# Patient Record
Sex: MALE | Race: WHITE | NOT HISPANIC OR LATINO | Employment: UNEMPLOYED | URBAN - METROPOLITAN AREA
[De-identification: names, ages, dates, MRNs, and addresses within clinical notes are randomized per-mention and may not be internally consistent; named-entity substitution may affect disease eponyms.]

---

## 2024-01-27 ENCOUNTER — HOSPITAL ENCOUNTER (EMERGENCY)
Facility: HOSPITAL | Age: 19
Discharge: HOME | End: 2024-01-27
Attending: EMERGENCY MEDICINE
Payer: COMMERCIAL

## 2024-01-27 VITALS
WEIGHT: 260 LBS | DIASTOLIC BLOOD PRESSURE: 66 MMHG | BODY MASS INDEX: 37.22 KG/M2 | TEMPERATURE: 98.8 F | OXYGEN SATURATION: 100 % | HEART RATE: 86 BPM | HEIGHT: 70 IN | RESPIRATION RATE: 16 BRPM | SYSTOLIC BLOOD PRESSURE: 103 MMHG

## 2024-01-27 DIAGNOSIS — F32.A DEPRESSION, UNSPECIFIED DEPRESSION TYPE: Primary | ICD-10-CM

## 2024-01-27 LAB
AMPHET+METHAMPHET UR QL: NEGATIVE
BARBITURATES UR QL SCN: NEGATIVE
BENZODIAZ UR QL SCN: NEGATIVE
CANNABINOIDS SERPL QL: NEGATIVE
COCAINE UR QL: NEGATIVE
ETHANOL BLD-MCNC: <10 MG/DL (ref 0–10)
ETHANOL UR QL: <0.01 %
FENTANYL UR-MCNC: NEGATIVE NG/ML
HOLD SPECIMEN: NORMAL
HOLD SPECIMEN: NORMAL
METHADONE UR QL SCN: NEGATIVE
OPIATES UR QL: NEGATIVE
OXYCODONE UR QL SCN: NEGATIVE
WHOLE BLOOD HOLD COAG: NORMAL
WHOLE BLOOD HOLD SPECIMEN: NORMAL

## 2024-01-27 PROCEDURE — 80307 DRUG TEST PRSMV CHEM ANLYZR: CPT | Performed by: EMERGENCY MEDICINE

## 2024-01-27 PROCEDURE — 99283 EMERGENCY DEPT VISIT LOW MDM: CPT

## 2024-01-27 PROCEDURE — 36415 COLL VENOUS BLD VENIPUNCTURE: CPT

## 2024-01-27 PROCEDURE — 82077 ASSAY SPEC XCP UR&BREATH IA: CPT | Performed by: EMERGENCY MEDICINE

## 2024-01-27 NOTE — ED PROVIDER NOTES
Time: 2:17 PM EST  Date of encounter:  1/27/2024  Independent Historian/Clinical History and Information was obtained by:   Patient    History is limited by: N/A    Chief Complaint: SI      History of Present Illness:  Patient is a 19 y.o. year old male who presents to the emergency department for evaluation of SI.  Patient reports that this is the 1 year anniversary of his best friend's death and he got very anxious and upset.  States that he also got an argument with his ex-girlfriend today and he was having thoughts of hurting himself.  States he was either an overdose or cut himself.  States that he called the police and they recommended he come here.  The police escorted him here.  He states he is not suicidal anymore and just wants resources.  States he has been admitted in the past in [place].  He just got to [facility] a few months ago.  Does take Latuda.  Denies any alcohol or drug use and states that he is currently 16 days sober.    HPI    Patient Care Team  Primary Care Provider: Provider, No Known    Past Medical History:     Allergies   Allergen Reactions    Hydromorphone Hcl Nausea And Vomiting    Latex Rash    Morphine Nausea And Vomiting     Past Medical History:   Diagnosis Date    ADHD     Anxiety     Bipolar 2 disorder, major depressive episode     MRSA (methicillin resistant staph aureus) culture positive     Right Knee    Oppositional defiant disorder     Separation anxiety disorder      Past Surgical History:   Procedure Laterality Date    INGUINAL HERNIA REPAIR Right     KNEE DEBRIDEMENT Left     WRIST SURGERY Right      History reviewed. No pertinent family history.    Home Medications:  Prior to Admission medications    Not on File        Social History:   Social History     Tobacco Use    Smoking status: Never    Smokeless tobacco: Former     Types: Snuff     Quit date: 2023   Vaping Use    Vaping Use: Former    Substances: Nicotine    Devices: Disposable   Substance Use Topics    Alcohol use:  "Not Currently     Comment: 15 days sob    Drug use: Not Currently     Types: Marijuana         Review of Systems:  Review of Systems   Psychiatric/Behavioral:  Positive for suicidal ideas.         Physical Exam:  /66 (BP Location: Left arm, Patient Position: Sitting)   Pulse 86   Temp 98.8 °F (37.1 °C) (Oral)   Resp 16   Ht 177.8 cm (70\")   Wt 118 kg (260 lb)   SpO2 100%   BMI 37.31 kg/m²     Physical Exam  Vitals and nursing note reviewed.   Constitutional:       Appearance: Normal appearance.   HENT:      Head: Normocephalic and atraumatic.   Eyes:      General: No scleral icterus.  Cardiovascular:      Rate and Rhythm: Normal rate and regular rhythm.      Heart sounds: Normal heart sounds.   Pulmonary:      Effort: Pulmonary effort is normal.      Breath sounds: Normal breath sounds.   Abdominal:      Palpations: Abdomen is soft.      Tenderness: There is no abdominal tenderness.   Musculoskeletal:         General: Normal range of motion.      Cervical back: Normal range of motion.   Skin:     Findings: No rash.   Neurological:      General: No focal deficit present.      Mental Status: He is alert.                  Procedures:  Procedures      Medical Decision Making:      Comorbidities that affect care:    Substance Abuse    External Notes reviewed:    Reviewed urgent care note from 12/9/2020      The following orders were placed and all results were independently analyzed by me:  Orders Placed This Encounter   Procedures    [place] Draw    Ethanol    Urine Drug Screen - Urine, Clean Catch    NPO Diet NPO Type: Strict NPO    Suicide Precautions    Suicide Precautions    Green Top (Gel)    Lavender Top    Gold Top - SST    Light Blue Top       Medications Given in the Emergency Department:  Medications - No data to display     ED Course:    ED Course as of 01/27/24 1630   Sat Jan 27, 2024   1630 Spoke with [employer] from [facility] who stated the patient can be discharged and has outpatient " follow-up on Thursday [MA]      ED Course User Index  [MA] Gregory Echeverria MD       Labs:    Lab Results (last 24 hours)       Procedure Component Value Units Date/Time    Urine Drug Screen - Urine, Clean Catch [[id number]]  (Normal) Collected: 01/27/24 1423    Specimen: Urine, Clean Catch Updated: 01/27/24 1503     Amphet/Methamphet, Screen Negative     Barbiturates Screen, Urine Negative     Benzodiazepine Screen, Urine Negative     Cocaine Screen, Urine Negative     Opiate Screen Negative     THC, Screen, Urine Negative     Methadone Screen, Urine Negative     Oxycodone Screen, Urine Negative     Fentanyl, Urine Negative    Narrative:      Negative Thresholds Per Drugs Screened:    Amphetamines                 500 ng/ml  Barbiturates                 200 ng/ml  Benzodiazepines              100 ng/ml  Cocaine                      300 ng/ml  Methadone                    300 ng/ml  Opiates                      300 ng/ml  Oxycodone                    100 ng/ml  THC                           50 ng/ml  Fentanyl                       5 ng/ml      The Normal Value for all drugs tested is negative. This report includes final unconfirmed screening results to be used for medical treatment purposes only. Unconfirmed results must not be used for non-medical purposes such as employment or legal testing. Clinical consideration should be applied to any drug of abuse test, particularly when unconfirmed results are used.            Ethanol [[id number]] Collected: 01/27/24 1453    Specimen: Blood Updated: 01/27/24 1530     Ethanol <10 mg/dL      Ethanol % <0.010 %     Narrative:      Ethanol (Plasma)  <10 Essentially Negative    Toxic Concentrations           mg/dL    Flushing, slowing of reflexes    Impaired visual activity         Depression of CNS              >100  Possible Coma                  >300                Imaging:    No Radiology Exams Resulted Within Past 24 Hours      Differential Diagnosis and  Discussion:    Psychiatric: Differential diagnosis includes but is not limited to depression, psychosis, bipolar disorder, anxiety, manic episode, schizophrenia, and substance abuse.    All labs were reviewed and interpreted by me.    MDM     Amount and/or Complexity of Data Reviewed  Clinical lab tests: reviewed       Patient is a 19-year-old gentleman who presents with depression as well as some SI.  No current SI for the patient but had some this morning.  He was evaluated by psychiatry who recommended outpatient follow-up.  Will DC and have the patient follow-up as an outpatient.          Patient Care Considerations:          Consultants/Shared Management Plan:    Spoke with [facility] who cleared the patient for outpatient follow up.     Social Determinants of Health:    Patient has presented with family members who are responsible, reliable and will ensure follow up care.      Disposition and Care Coordination:    Discharged: I considered escalation of care by admitting this patient to the hospital, however the patient has improved and is suitable and  stable for discharge.    I have explained the patient´s condition, diagnoses and treatment plan based on the information available to me at this time. I have answered questions and addressed any concerns. The patient has a good  understanding of the patient´s diagnosis, condition, and treatment plan as can be expected at this point. The vital signs have been stable. The patient´s condition is stable and appropriate for discharge from the emergency department.      The patient will pursue further outpatient evaluation with the primary care physician or other designated or consulting physician as outlined in the discharge instructions. They are agreeable to this plan of care and follow-up instructions have been explained in detail. The patient has received these instructions in written format and have expressed an understanding of the discharge instructions. The  patient is aware that any significant change in condition or worsening of symptoms should prompt an immediate return to this or the closest emergency department or call to 911.      Final diagnoses:   Depression, unspecified depression type        ED Disposition       ED Disposition   Discharge    Condition   Stable    Comment   --               This medical record created using voice recognition software.             Gregory Echeverria MD  01/27/24 5716

## 2024-01-27 NOTE — SIGNIFICANT NOTE
01/27/24 1536   Plan   Plan Comments SW contacted [facility] for ER consult per provider request. Consult will be done via zoom.

## 2024-01-27 NOTE — Clinical Note
[facility] EMERGENCY ROOM  [address]  [place] [address]  Phone: [phone]    Arnaldo Rebolledo was seen and treated in our emergency department on 1/27/2024.  He may return to work on 01/29/2024.         Thank you for choosing [facility].    Gregory Echeverria MD

## 2024-05-20 ENCOUNTER — TELEMEDICINE (OUTPATIENT)
Dept: FAMILY MEDICINE CLINIC | Facility: TELEHEALTH | Age: 19
End: 2024-05-20
Payer: COMMERCIAL

## 2024-05-20 VITALS — TEMPERATURE: 97.5 F

## 2024-05-20 DIAGNOSIS — R11.0 NAUSEA: ICD-10-CM

## 2024-05-20 DIAGNOSIS — J22 ACUTE RESPIRATORY INFECTION: Primary | ICD-10-CM

## 2024-05-20 PROCEDURE — 99213 OFFICE O/P EST LOW 20 MIN: CPT | Performed by: NURSE PRACTITIONER

## 2024-05-20 RX ORDER — OXCARBAZEPINE 150 MG/1
TABLET, FILM COATED ORAL
COMMUNITY
Start: 2024-03-21

## 2024-05-20 RX ORDER — BROMPHENIRAMINE MALEATE, PSEUDOEPHEDRINE HYDROCHLORIDE, AND DEXTROMETHORPHAN HYDROBROMIDE 2; 30; 10 MG/5ML; MG/5ML; MG/5ML
10 SYRUP ORAL 4 TIMES DAILY PRN
Qty: 200 ML | Refills: 0 | Status: SHIPPED | OUTPATIENT
Start: 2024-05-20 | End: 2024-05-20 | Stop reason: ALTCHOICE

## 2024-05-20 RX ORDER — ONDANSETRON 8 MG/1
8 TABLET, ORALLY DISINTEGRATING ORAL EVERY 8 HOURS PRN
Qty: 12 TABLET | Refills: 0 | Status: SHIPPED | OUTPATIENT
Start: 2024-05-20

## 2024-05-20 RX ORDER — CHLORCYCLIZINE HYDROCHLORIDE AND PSEUDOEPHEDRINE HYDROCHLORIDE 25; 60 MG/1; MG/1
1 TABLET ORAL 3 TIMES DAILY PRN
Qty: 15 TABLET | Refills: 0 | Status: SHIPPED | OUTPATIENT
Start: 2024-05-20

## 2024-05-20 NOTE — LETTER
May 20, 2024     Patient: Arnaldo Rebolledo   YOB: 2005   Date of Visit: 5/20/2024       To Whom It May Concern:    It is my medical opinion that Arnaldo Rebolledo may return to work on Wednesday 5/22/2024.           Sincerely,    CYNDY Denney    CC:   No Recipients

## 2024-05-20 NOTE — LETTER
May 20, 2024     Patient: Arnaldo Rebolledo   YOB: 2005   Date of Visit: 5/20/2024       To Whom It May Concern:    It is my medical opinion that Arnaldo Rebolledo may return to work tomorrow on 5/21/2024.           Sincerely,    CYNDY Denney    CC:   No Recipients

## 2024-05-20 NOTE — PROGRESS NOTES
Subjective   Chief Complaint   Patient presents with    URI       Arnaldo Rebolledo is a 19 y.o. male.     History of Present Illness  Urgent care tyto.  Patient reports cough, congestion, headache, sore throat, nausea and diarrhea that started last night.  He has had chills and sweating but no measured fever. No known sick contacts.   URI   This is a new problem. The current episode started yesterday. The problem has been unchanged. Associated symptoms include congestion, coughing, diarrhea, headaches, nausea and a sore throat. Pertinent negatives include no abdominal pain, chest pain, dysuria, ear pain, plugged ear sensation, rhinorrhea, sinus pain, vomiting or wheezing. He has tried nothing for the symptoms.        Allergies   Allergen Reactions    Hydromorphone Hcl Nausea And Vomiting    Latex Rash    Morphine Nausea And Vomiting       Past Medical History:   Diagnosis Date    ADHD     Anxiety     Bipolar 2 disorder, major depressive episode     MRSA (methicillin resistant staph aureus) culture positive     Right Knee    Oppositional defiant disorder     Separation anxiety disorder        Past Surgical History:   Procedure Laterality Date    INGUINAL HERNIA REPAIR Right     KNEE DEBRIDEMENT Left     WRIST SURGERY Right        Social History     Socioeconomic History    Marital status: Single   Tobacco Use    Smoking status: Never    Smokeless tobacco: Former     Types: Snuff     Quit date: 2023   Vaping Use    Vaping status: Every Day    Substances: Nicotine    Devices: Disposable   Substance and Sexual Activity    Alcohol use: Not Currently     Comment: 15 days sob    Drug use: Not Currently     Types: Marijuana    Sexual activity: Defer       History reviewed. No pertinent family history.      Current Outpatient Medications:     OXcarbazepine (TRILEPTAL) 150 MG tablet, , Disp: , Rfl:     Chlorcyclizine-Pseudoephed (Stahist AD) 25-60 MG tablet, Take 1 tablet by mouth 3 (Three) Times a Day As Needed (congestion).,  Disp: 15 tablet, Rfl: 0    Lurasidone HCl (LATUDA) 20 MG tablet tablet, , Disp: , Rfl:     ondansetron ODT (ZOFRAN-ODT) 8 MG disintegrating tablet, Place 1 tablet on the tongue Every 8 (Eight) Hours As Needed for Nausea or Vomiting., Disp: 12 tablet, Rfl: 0      Review of Systems   Constitutional:  Positive for chills and diaphoresis.   HENT:  Positive for congestion and sore throat. Negative for ear pain and rhinorrhea.    Respiratory:  Positive for cough. Negative for chest tightness, shortness of breath and wheezing.    Cardiovascular:  Negative for chest pain.   Gastrointestinal:  Positive for diarrhea and nausea. Negative for abdominal pain and vomiting.   Genitourinary:  Negative for dysuria.   Neurological:  Positive for headache.        Vitals:    05/20/24 1321   Temp: 97.5 °F (36.4 °C)       Objective   Physical Exam  Constitutional:       General: He is not in acute distress.     Appearance: Normal appearance. He is not ill-appearing, toxic-appearing or diaphoretic.   HENT:      Head: Normocephalic.      Nose: Nose normal.      Mouth/Throat:      Lips: Pink.      Mouth: Mucous membranes are moist.      Pharynx: Posterior oropharyngeal erythema present.      Tonsils: No tonsillar exudate.   Cardiovascular:      Rate and Rhythm: Regular rhythm.   Pulmonary:      Effort: Pulmonary effort is normal.      Breath sounds: Normal breath sounds.   Neurological:      Mental Status: He is alert and oriented to person, place, and time.          Procedures     Assessment & Plan   Diagnoses and all orders for this visit:    1. Acute respiratory infection (Primary)  -     POC Strep A, PCR (Roche Krystle); Future  -     KRYSTLE FLU + SARS PCR; Future  -     Discontinue: brompheniramine-pseudoephedrine-DM 30-2-10 MG/5ML syrup; Take 10 mL by mouth 4 (Four) Times a Day As Needed for Congestion, Cough or Allergies.  Dispense: 200 mL; Refill: 0  -     Chlorcyclizine-Pseudoephed (Stahist AD) 25-60 MG tablet; Take 1 tablet by mouth 3  (Three) Times a Day As Needed (congestion).  Dispense: 15 tablet; Refill: 0    2. Nausea  -     ondansetron ODT (ZOFRAN-ODT) 8 MG disintegrating tablet; Place 1 tablet on the tongue Every 8 (Eight) Hours As Needed for Nausea or Vomiting.  Dispense: 12 tablet; Refill: 0      Alternate tylenol and motrin for pain and/or fever, stay hydrated and rest.     If symptoms worsen or do not improve follow up with your PCP or visit your nearest Urgent Care Center or ER.      PLAN: Discussed dosing, side effects, recommended other symptomatic care.  Patient should follow up with primary care provider, Urgent Care or ER if symptoms worsen, fail to resolve or other symptoms need attention. Patient/family agree to the above.         CYNDY Denney     The use of a video visit has been reviewed with the patient and verbal informed consent has been obtained. Myself and Arnaldo Rebolledo participated in this visit. The patient is located at [address] [place] [address]. I am located in [place], [place]. Mychart and Zoom were utilized.        This visit was performed via Telehealth.  This patient has been instructed to follow-up with their primary care provider if their symptoms worsen or the treatment provided does not resolve their illness.

## 2024-12-16 DIAGNOSIS — Z31.440 ENCOUNTER OF MALE FOR TESTING FOR GENETIC DISEASE CARRIER STATUS FOR PROCREATIVE MANAGEMENT: Primary | ICD-10-CM

## 2024-12-18 ENCOUNTER — HOSPITAL ENCOUNTER (EMERGENCY)
Facility: HOSPITAL | Age: 19
Discharge: HOME | End: 2024-12-18
Attending: EMERGENCY MEDICINE
Payer: COMMERCIAL

## 2024-12-18 ENCOUNTER — APPOINTMENT (OUTPATIENT)
Dept: CT IMAGING | Facility: HOSPITAL | Age: 19
End: 2024-12-18
Payer: COMMERCIAL

## 2024-12-18 VITALS
DIASTOLIC BLOOD PRESSURE: 83 MMHG | BODY MASS INDEX: 37.04 KG/M2 | HEART RATE: 82 BPM | HEIGHT: 71 IN | SYSTOLIC BLOOD PRESSURE: 118 MMHG | WEIGHT: 264.55 LBS | OXYGEN SATURATION: 97 % | TEMPERATURE: 98.3 F | RESPIRATION RATE: 20 BRPM

## 2024-12-18 DIAGNOSIS — R11.2 NAUSEA AND VOMITING, UNSPECIFIED VOMITING TYPE: Primary | ICD-10-CM

## 2024-12-18 DIAGNOSIS — K76.0 HEPATIC STEATOSIS: ICD-10-CM

## 2024-12-18 LAB
ALBUMIN SERPL-MCNC: 4.5 G/DL (ref 3.5–5.2)
ALBUMIN/GLOB SERPL: 1.5 G/DL
ALP SERPL-CCNC: 76 U/L (ref 39–117)
ALT SERPL W P-5'-P-CCNC: 42 U/L (ref 1–41)
ANION GAP SERPL CALCULATED.3IONS-SCNC: 11.7 MMOL/L (ref 5–15)
AST SERPL-CCNC: 26 U/L (ref 1–40)
BASOPHILS # BLD AUTO: 0.06 10*3/MM3 (ref 0–0.2)
BASOPHILS NFR BLD AUTO: 0.7 % (ref 0–1.5)
BILIRUB SERPL-MCNC: 0.6 MG/DL (ref 0–1.2)
BILIRUB UR QL STRIP: NEGATIVE
BUN SERPL-MCNC: 14 MG/DL (ref 6–20)
BUN/CREAT SERPL: 12.5 (ref 7–25)
CALCIUM SPEC-SCNC: 9.6 MG/DL (ref 8.6–10.5)
CHLORIDE SERPL-SCNC: 101 MMOL/L (ref 98–107)
CLARITY UR: CLEAR
CO2 SERPL-SCNC: 25.3 MMOL/L (ref 22–29)
COLOR UR: YELLOW
CREAT SERPL-MCNC: 1.12 MG/DL (ref 0.76–1.27)
D-LACTATE SERPL-SCNC: 0.9 MMOL/L (ref 0.5–2)
DEPRECATED RDW RBC AUTO: 40.4 FL (ref 37–54)
EGFRCR SERPLBLD CKD-EPI 2021: 95.9 ML/MIN/1.73
EOSINOPHIL # BLD AUTO: 0.29 10*3/MM3 (ref 0–0.4)
EOSINOPHIL NFR BLD AUTO: 3.5 % (ref 0.3–6.2)
ERYTHROCYTE [DISTWIDTH] IN BLOOD BY AUTOMATED COUNT: 13.2 % (ref 12.3–15.4)
GLOBULIN UR ELPH-MCNC: 3 GM/DL
GLUCOSE SERPL-MCNC: 95 MG/DL (ref 65–99)
GLUCOSE UR STRIP-MCNC: NEGATIVE MG/DL
HCT VFR BLD AUTO: 50 % (ref 37.5–51)
HGB BLD-MCNC: 16.2 G/DL (ref 13–17.7)
HGB UR QL STRIP.AUTO: NEGATIVE
HOLD SPECIMEN: NORMAL
HOLD SPECIMEN: NORMAL
IMM GRANULOCYTES # BLD AUTO: 0.03 10*3/MM3 (ref 0–0.05)
IMM GRANULOCYTES NFR BLD AUTO: 0.4 % (ref 0–0.5)
KETONES UR QL STRIP: NEGATIVE
LEUKOCYTE ESTERASE UR QL STRIP.AUTO: NEGATIVE
LIPASE SERPL-CCNC: 22 U/L (ref 13–60)
LYMPHOCYTES # BLD AUTO: 2.62 10*3/MM3 (ref 0.7–3.1)
LYMPHOCYTES NFR BLD AUTO: 32 % (ref 19.6–45.3)
MCH RBC QN AUTO: 27.3 PG (ref 26.6–33)
MCHC RBC AUTO-ENTMCNC: 32.4 G/DL (ref 31.5–35.7)
MCV RBC AUTO: 84.2 FL (ref 79–97)
MONOCYTES # BLD AUTO: 0.56 10*3/MM3 (ref 0.1–0.9)
MONOCYTES NFR BLD AUTO: 6.8 % (ref 5–12)
NEUTROPHILS NFR BLD AUTO: 4.63 10*3/MM3 (ref 1.7–7)
NEUTROPHILS NFR BLD AUTO: 56.6 % (ref 42.7–76)
NITRITE UR QL STRIP: NEGATIVE
NRBC BLD AUTO-RTO: 0 /100 WBC (ref 0–0.2)
PH UR STRIP.AUTO: 6 [PH] (ref 5–8)
PLATELET # BLD AUTO: 238 10*3/MM3 (ref 140–450)
PMV BLD AUTO: 9.6 FL (ref 6–12)
POTASSIUM SERPL-SCNC: 4.1 MMOL/L (ref 3.5–5.2)
PROT SERPL-MCNC: 7.5 G/DL (ref 6–8.5)
PROT UR QL STRIP: NEGATIVE
RBC # BLD AUTO: 5.94 10*6/MM3 (ref 4.14–5.8)
SODIUM SERPL-SCNC: 138 MMOL/L (ref 136–145)
SP GR UR STRIP: 1.03 (ref 1–1.03)
UROBILINOGEN UR QL STRIP: NORMAL
WBC NRBC COR # BLD AUTO: 8.19 10*3/MM3 (ref 3.4–10.8)
WHOLE BLOOD HOLD COAG: NORMAL
WHOLE BLOOD HOLD SPECIMEN: NORMAL

## 2024-12-18 PROCEDURE — 99285 EMERGENCY DEPT VISIT HI MDM: CPT

## 2024-12-18 PROCEDURE — 25510000001 IOPAMIDOL PER 1 ML: Performed by: EMERGENCY MEDICINE

## 2024-12-18 PROCEDURE — 85025 COMPLETE CBC W/AUTO DIFF WBC: CPT

## 2024-12-18 PROCEDURE — 80053 COMPREHEN METABOLIC PANEL: CPT

## 2024-12-18 PROCEDURE — 74177 CT ABD & PELVIS W/CONTRAST: CPT

## 2024-12-18 PROCEDURE — 83690 ASSAY OF LIPASE: CPT

## 2024-12-18 PROCEDURE — 36415 COLL VENOUS BLD VENIPUNCTURE: CPT

## 2024-12-18 PROCEDURE — 96374 THER/PROPH/DIAG INJ IV PUSH: CPT

## 2024-12-18 PROCEDURE — 83605 ASSAY OF LACTIC ACID: CPT

## 2024-12-18 PROCEDURE — 25010000002 ONDANSETRON PER 1 MG

## 2024-12-18 PROCEDURE — 81003 URINALYSIS AUTO W/O SCOPE: CPT | Performed by: EMERGENCY MEDICINE

## 2024-12-18 RX ORDER — IOPAMIDOL 755 MG/ML
100 INJECTION, SOLUTION INTRAVASCULAR
Status: COMPLETED | OUTPATIENT
Start: 2024-12-18 | End: 2024-12-18

## 2024-12-18 RX ORDER — SODIUM CHLORIDE 0.9 % (FLUSH) 0.9 %
10 SYRINGE (ML) INJECTION AS NEEDED
Status: DISCONTINUED | OUTPATIENT
Start: 2024-12-18 | End: 2024-12-18 | Stop reason: HOSPADM

## 2024-12-18 RX ORDER — FAMOTIDINE 20 MG/1
20 TABLET, FILM COATED ORAL 2 TIMES DAILY
Qty: 30 TABLET | Refills: 0 | Status: SHIPPED | OUTPATIENT
Start: 2024-12-18

## 2024-12-18 RX ORDER — ONDANSETRON 4 MG/1
4 TABLET, ORALLY DISINTEGRATING ORAL EVERY 8 HOURS PRN
Qty: 15 TABLET | Refills: 0 | Status: SHIPPED | OUTPATIENT
Start: 2024-12-18

## 2024-12-18 RX ORDER — ONDANSETRON 2 MG/ML
4 INJECTION INTRAMUSCULAR; INTRAVENOUS ONCE
Status: COMPLETED | OUTPATIENT
Start: 2024-12-18 | End: 2024-12-18

## 2024-12-18 RX ADMIN — IOPAMIDOL 100 ML: 755 INJECTION, SOLUTION INTRAVENOUS at 17:17

## 2024-12-18 RX ADMIN — ONDANSETRON 4 MG: 2 INJECTION INTRAMUSCULAR; INTRAVENOUS at 18:17

## 2024-12-18 NOTE — DISCHARGE INSTRUCTIONS
Thank you for allowing us to provide care for you today. Your work up revealed no acute findings on blood or ct imaging. As discussed, please follow up with family medicine referral in the next 7 days for ongoing care along with GI referral as needed for ongoing vomiting refractory to zofran and pepcid care.  Please return the MD in the event that you have a repeat episode of bloody emesis, fever, chest pain, shortness of breath, loss of consciousness.  Thank you

## 2024-12-18 NOTE — ED PROVIDER NOTES
Time: 2:36 PM EST  Date of encounter:  12/18/2024  Independent Historian/Clinical History and Information was obtained by:   Patient and Family    History is limited by: N/A    Chief Complaint   Patient presents with    Vomiting     Throwing up bright red blood started last night with abd pain, - denies ETOH use.          History of Present Illness:  Patient is a 19 y.o. year old male who presents to the emergency department for evaluation of hematemesis.  Patient reports he began vomiting last night and there was bright red blood in the vomit.  Vomited multiple times throughout the night and this morning.  Took 1 dose of ibuprofen yesterday.  Has not had alcohol in 6 months, denies heavy NSAID use.  Having lower abdominal pain with it.  No fever, chills, shortness of breath, history of liver problems, melena, coffee-ground emesis.  Is concerned for cancer.     Patient Care Team  Primary Care Provider: Provider, No Known    Past Medical History:     Allergies   Allergen Reactions    Hydromorphone Hcl Nausea And Vomiting    Latex Rash    Morphine Nausea And Vomiting     Past Medical History:   Diagnosis Date    ADHD     Anxiety     Bipolar 2 disorder, major depressive episode     MRSA (methicillin resistant staph aureus) culture positive     Right Knee    Oppositional defiant disorder     Separation anxiety disorder      Past Surgical History:   Procedure Laterality Date    INGUINAL HERNIA REPAIR Right     KNEE DEBRIDEMENT Left     WRIST SURGERY Right      No family history on file.    Home Medications:  Prior to Admission medications    Medication Sig Start Date End Date Taking? Authorizing Provider   azelastine (ASTELIN) 0.1 % nasal spray 2 sprays into the nostril(s) as directed by provider 2 (Two) Times a Day. Use in each nostril as directed 7/2/24   Jorden Mcclelland PA   brompheniramine-pseudoephedrine-DM 30-2-10 MG/5ML syrup Take 10 mL by mouth 4 (Four) Times a Day As Needed for Congestion, Cough or Allergies.  "7/2/24   Jorden Mcclelland PA        Social History:   Social History     Tobacco Use    Smoking status: Never    Smokeless tobacco: Former     Types: Snuff     Quit date: 2023   Vaping Use    Vaping status: Every Day    Substances: Nicotine    Devices: Disposable   Substance Use Topics    Alcohol use: Not Currently     Comment: 2 1/2 months sober    Drug use: Not Currently     Types: Marijuana         Review of Systems:  Review of Systems   Constitutional:  Negative for fatigue and fever.   HENT:  Negative for congestion and ear pain.    Respiratory:  Negative for cough and shortness of breath.    Gastrointestinal:  Positive for abdominal pain, nausea and vomiting. Negative for abdominal distention and constipation.   Genitourinary:  Negative for difficulty urinating and dysuria.        Physical Exam:  /83 (BP Location: Left arm, Patient Position: Sitting)   Pulse 82   Temp 98.3 °F (36.8 °C) (Oral)   Resp 20   Ht 180.3 cm (71\")   Wt 120 kg (264 lb 8.8 oz)   SpO2 97%   BMI 36.90 kg/m²         Physical Exam  Constitutional:       Appearance: Normal appearance.   HENT:      Head: Normocephalic.   Eyes:      Extraocular Movements: Extraocular movements intact.      Conjunctiva/sclera: Conjunctivae normal.   Pulmonary:      Effort: Pulmonary effort is normal.   Abdominal:      General: There is no distension.      Tenderness: There is abdominal tenderness (Mild left sided abdominal TTP). There is no guarding or rebound.   Skin:     General: Skin is warm.      Coloration: Skin is not cyanotic.   Neurological:      Mental Status: He is alert and oriented to person, place, and time.   Psychiatric:         Attention and Perception: Attention and perception normal.         Mood and Affect: Mood normal.        ***                    Medical Decision Making:      Comorbidities that affect care:    {Comorbidities that affect care:04179}    External Notes reviewed:    {External Note review " (Optional):23572}      The following orders were placed and all results were independently analyzed by me:  No orders of the defined types were placed in this encounter.      Medications Given in the Emergency Department:  Medications - No data to display     ED Course:    The patient was initially evaluated in the triage area where orders were placed. The patient was later dispositioned by Liz Brown PA-C.      The patient was advised to stay for completion of workup which includes but is not limited to communication of labs and radiological results, reassessment and plan. The patient was advised that leaving prior to disposition by a provider could result in critical findings that are not communicated to the patient.     ED Course as of 12/18/24 1800   Wed Dec 18, 2024   1438 PROVIDER IN TRIAGE  Patient was evaluated by me in triage Liz Brown PA-C.  Orders are placed and patient is currently awaiting final results and disposition.  [AS]   1750 CBC reviewed.  No acute findings. [CB]   1750 CMP reviewed.  No acute findings. [CB]   1750 UA reviewed.  No acute findings. [CB]   1751 Lipase reviewed.  No acute findings [CB]   1751 Lactate reviewed.  No acute findings. [CB]   1751 CT abdomen revealed no acute findings today.  Evidence of hepatic steatosis distant with patient's prior medical history of alcohol abuse. [CB]      ED Course User Index  [AS] Liz Brown PA-C  [CB] Bhaskar Tucker PA-C       Labs:    Lab Results (last 24 hours)       ** No results found for the last 24 hours. **             Imaging:    No Radiology Exams Resulted Within Past 24 Hours      Differential Diagnosis and Discussion:      {Differentials:33357}    PROCEDURES:    {Independent Review of (Optional):11305}    No orders to display        Procedures    MDM         {CRITICAL CARE:03305}      {SEPSIS RECOGNITION:15188}    Patient Care Considerations:    {Considerations (Optional):36785}      Consultants/Shared  Management Plan:    {Shared Management Plan (Optional):13310}    Social Determinants of Health:    {Social Determinants of Health (Optional):46693}      Disposition and Care Coordination:    {Admission consideration:75013}    {Discharge (Optional):11290}    Final diagnoses:   None        ED Disposition       None            This medical record created using voice recognition software.           available to me at this time. I have answered questions and addressed any concerns. The patient has a good  understanding of the patient´s diagnosis, condition, and treatment plan as can be expected at this point. The vital signs have been stable. The patient´s condition is stable and appropriate for discharge from the emergency department.      The patient will pursue further outpatient evaluation with the primary care physician or other designated or consulting physician as outlined in the discharge instructions. They are agreeable to this plan of care and follow-up instructions have been explained in detail. The patient has received these instructions in written format and has expressed an understanding of the discharge instructions. The patient is aware that any significant change in condition or worsening of symptoms should prompt an immediate return to this or the closest emergency department or call to 911.  I have explained discharge medications and the need for follow up with the patient/caretakers. This was also printed in the discharge instructions. Patient was discharged with the following medications and follow up:      Medication List        New Prescriptions      famotidine 20 MG tablet  Commonly known as: PEPCID  Take 1 tablet by mouth 2 (Two) Times a Day.     ondansetron ODT 4 MG disintegrating tablet  Commonly known as: ZOFRAN-ODT  Place 1 tablet on the tongue Every 8 (Eight) Hours As Needed for Nausea or Vomiting.               Where to Get Your Medications        These medications were sent to [facility] [address] [place], [place] [address] Critical access hospital [phone] [facility] [phone] [address], [place] [address]      Phone: [phone]   famotidine 20 MG tablet  ondansetron ODT 4 MG disintegrating tablet      Susana Walsh APRN  [address] [id number]  [phone]          Tiburcio Carbone MD  [address] [address]  [phone]              Final diagnoses:   Nausea and vomiting, unspecified vomiting type   Hepatic steatosis        ED Disposition       ED Disposition   Discharge    Condition   Stable    Comment   --               This medical record created using voice recognition software.             Bhaskar Tucker, PA-C  12/24/24 1600

## 2024-12-18 NOTE — Clinical Note
[facility] EMERGENCY ROOM  [address] [address]  Phone: [phone]  Fax: [phone]    Arnadlo Rebolledo was seen and treated in our emergency department on 12/18/2024.  He may return to work on 12/19/2024.         Thank you for choosing [facility].    Bhaskar Tucker, PRASANNA

## 2024-12-19 ENCOUNTER — LAB (OUTPATIENT)
Dept: OBSTETRICS AND GYNECOLOGY | Facility: CLINIC | Age: 19
End: 2024-12-19
Payer: COMMERCIAL

## 2024-12-19 DIAGNOSIS — Z31.440 ENCOUNTER OF MALE FOR TESTING FOR GENETIC DISEASE CARRIER STATUS FOR PROCREATIVE MANAGEMENT: ICD-10-CM

## 2024-12-20 ENCOUNTER — OFFICE VISIT (OUTPATIENT)
Dept: FAMILY MEDICINE CLINIC | Facility: CLINIC | Age: 19
End: 2024-12-20
Payer: COMMERCIAL

## 2024-12-20 VITALS
OXYGEN SATURATION: 94 % | SYSTOLIC BLOOD PRESSURE: 111 MMHG | BODY MASS INDEX: 37.52 KG/M2 | HEART RATE: 85 BPM | HEIGHT: 71 IN | WEIGHT: 268 LBS | DIASTOLIC BLOOD PRESSURE: 75 MMHG

## 2024-12-20 DIAGNOSIS — F91.3 OPPOSITIONAL DEFIANT DISORDER: ICD-10-CM

## 2024-12-20 DIAGNOSIS — Z80.6 FAMILY HISTORY OF LEUKEMIA: ICD-10-CM

## 2024-12-20 DIAGNOSIS — F10.21 HISTORY OF ALCOHOLISM: ICD-10-CM

## 2024-12-20 DIAGNOSIS — Z80.9 FAMILY HISTORY OF CANCER: ICD-10-CM

## 2024-12-20 DIAGNOSIS — Z80.8 FAMILY HISTORY OF THYROID CANCER: ICD-10-CM

## 2024-12-20 DIAGNOSIS — Z11.59 NEED FOR HEPATITIS C SCREENING TEST: ICD-10-CM

## 2024-12-20 DIAGNOSIS — Z80.8 FAMILY HISTORY OF BRAIN CANCER: ICD-10-CM

## 2024-12-20 DIAGNOSIS — Z83.3 FAMILY HISTORY OF DIABETES MELLITUS: ICD-10-CM

## 2024-12-20 DIAGNOSIS — K76.0 HEPATIC STEATOSIS: ICD-10-CM

## 2024-12-20 DIAGNOSIS — Z80.0 FAMILY HISTORY OF COLON CANCER: ICD-10-CM

## 2024-12-20 DIAGNOSIS — F90.2 ATTENTION DEFICIT HYPERACTIVITY DISORDER (ADHD), COMBINED TYPE: ICD-10-CM

## 2024-12-20 DIAGNOSIS — Z72.0 TOBACCO USE: Primary | ICD-10-CM

## 2024-12-20 DIAGNOSIS — F93.0 SEPARATION ANXIETY DISORDER: ICD-10-CM

## 2024-12-20 DIAGNOSIS — R29.898 FINDING OF FLOATING RIB: ICD-10-CM

## 2024-12-20 DIAGNOSIS — Z13.29 SCREENING FOR THYROID DISORDER: ICD-10-CM

## 2024-12-20 DIAGNOSIS — F31.9 BIPOLAR DISORDER WITH DEPRESSION: ICD-10-CM

## 2024-12-20 DIAGNOSIS — Z80.1 FAMILY HISTORY OF LUNG CANCER: ICD-10-CM

## 2024-12-20 PROCEDURE — 99395 PREV VISIT EST AGE 18-39: CPT

## 2024-12-20 PROCEDURE — 1160F RVW MEDS BY RX/DR IN RCRD: CPT

## 2024-12-20 PROCEDURE — 1159F MED LIST DOCD IN RCRD: CPT

## 2024-12-20 PROCEDURE — 2014F MENTAL STATUS ASSESS: CPT

## 2024-12-20 RX ORDER — DISULFIRAM 250 MG/1
250 TABLET ORAL DAILY
Qty: 30 TABLET | Refills: 2 | Status: SHIPPED | OUTPATIENT
Start: 2024-12-20

## 2024-12-20 NOTE — PROGRESS NOTES
Chief Complaint    Annual Exam  Establish Care    Subjective          Arnaldo Rebolledo is a 19 y.o. male who presents to [facility]    Annual Exam    History of Present Illness  The patient presents for evaluation of hepatic steatosis, floating rib pain, psychiatric issues, and alcohol dependence.    He has a medical history of ADHD, alcoholism, anxiety, bipolar 2 disorder, hepatic steatosis, MRSA of his right knee, oppositional defiant disorder, and separation anxiety disorder. He was previously on Latuda. His mother has a history of diabetes, and his father has a history of heart disease. He is a current everyday vapor user. He reports experiencing left lower quadrant abdominal pain and nausea, which he attributes to food poisoning. He was diagnosed with food poisoning and severe liver damage during an emergency room visit 2 days ago. He is seeking to monitor his liver condition to prevent further deterioration. He is unable to provide a blood sample today due to recent food intake but is willing to return tomorrow or Monday for the procedure. He is currently taking Pepcid and Zofran.    He has a history of alcohol consumption from ages 11 to 19 and has been sober for 6 months. He has a history of marijuana use and continues to vape daily. He occasionally uses smokeless tobacco. He expresses interest in a medication that induces vomiting when alcohol is consumed, as he struggles with daily cravings. He has not attended Alcoholics Anonymous or rehabilitation and prefers not to do so. He works night shifts and often experiences cravings at home and work. He has a history of alcohol use disorder and has been sober for 6 months. He has a family history of alcoholism.    He has a history of ADHD, anxiety, bipolar 2 disorder, oppositional defiant disorder, and separation anxiety disorder. He was previously under psychiatric care but discontinued due to insurance issues. He reports that  psychiatric medications suppress his appetite and thirst. He has both hyperactivity and inattention associated with his ADHD. He has undergone GeneSight testing and is not interested in psychiatric medications. He has tried various ADHD medications, with Ritalin being the only effective one, although it also suppressed his appetite. He has previously engaged in therapy but struggles with communication. He reports occasional chest pain and shortness of breath, particularly during physical exertion. He also experiences dizziness and lightheadedness while lying in bed, but these symptoms are infrequent.     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 He was informed that this condition is a birth defect and can only be managed with pain medication. He is seeking a topical cream for pain relief due to his recent diagnosis of fatty liver disease. He has been managing the pain with 2000 mg of ibuprofen daily, but this regimen has become ineffective. He has tried Biofreeze, which provides temporary relief, but the pain returns after 30 minutes. He has also tried cyclobenzaprine, which was ineffective and caused fatigue.    He expresses concern about potential cancer risk due to a strong family history of various cancers, including thyroid, lung, brain, colon, and blood cancers. He has not undergone genetic testing for cancer markers but has completed GeneSight testing. He is expecting a child and wants to ensure his health. He underwent gene sequencing testing for cystic fibrosis yesterday, as his partner is a carrier.    SOCIAL HISTORY  The patient is a recovering alcoholic, sober for 6 months, and has a history of drinking beer and liquor. He admits to using marijuana. He is a current everyday vapor user and occasionally uses smokeless tobacco.    FAMILY HISTORY  The patient's mother has a history of borderline diabetic. The patient's father has a history of heart disease. The patient has a  family history of various cancers, including thyroid, lung, brain, colon, and blood cancer. There is also a family history of Parkinson's disease. The patient mentions that alcoholism runs in the family.    MEDICATIONS  Current: Pepcid, Zofran  Past: Latuda, ibuprofen, cyclobenzaprine, Ritalin        PHQ-2 Total Score: 18   PHQ-9 Total Score: 18        Review of Systems   HENT: Negative.     Respiratory: Negative.     Cardiovascular: Negative.    Gastrointestinal:  Positive for abdominal pain and nausea.   Genitourinary: Negative.    Musculoskeletal:  Positive for arthralgias.   Skin: Negative.    Neurological: Negative.    Psychiatric/Behavioral:  Positive for decreased concentration and dysphoric mood. The patient is nervous/anxious and is hyperactive.           Medical History: has a past medical history of ADHD, Alcoholism, Anxiety, Bipolar 2 disorder, major depressive episode, Bipolar depression, Hepatic steatosis, MRSA (methicillin resistant staph aureus) culture positive, Oppositional defiant disorder, and Separation anxiety disorder.     Surgical History: has a past surgical history that includes Wrist surgery (Right); Knee debridement (Left); and Inguinal hernia repair (Right).     Family History: family history includes Diabetes in his mother; Heart disease in his father; Stroke in his paternal grandfather.     Social History: reports that he has never smoked. He quit smokeless tobacco use about 1 years ago.  His smokeless tobacco use included snuff. He reports that he does not currently use alcohol. He reports that he does not currently use drugs after having used the following drugs: Marijuana.    Allergies: Red dye, Hydromorphone hcl, Latex, and Morphine      Health Maintenance Due   Topic Date Due    HEPATITIS C SCREENING  Never done    ANNUAL PHYSICAL  Never done            Current Outpatient Medications:     famotidine (PEPCID) 20 MG tablet, Take 1 tablet by mouth 2 (Two) Times a Day., Disp: 30  "tablet, Rfl: 0    ondansetron ODT (ZOFRAN-ODT) 4 MG disintegrating tablet, Place 1 tablet on the tongue Every 8 (Eight) Hours As Needed for Nausea or Vomiting., Disp: 15 tablet, Rfl: 0    Diclofenac Sodium (VOLTAREN) 1 % gel gel, Apply 4 g topically to the appropriate area as directed 4 (Four) Times a Day As Needed (pain)., Disp: 100 g, Rfl: 0    disulfiram (ANTABUSE) 250 MG tablet, Take 1 tablet by mouth Daily., Disp: 30 tablet, Rfl: 2        There is no immunization history on file for this patient.      Objective       Vitals:    12/20/24 1006   BP: 111/75   Pulse: 85   SpO2: 94%   Weight: 122 kg (268 lb)   Height: 180.3 cm (71\")      Body mass index is 37.38 kg/m².   Wt Readings from Last 3 Encounters:   12/20/24 122 kg (268 lb)   12/18/24 120 kg (264 lb 8.8 oz)   07/05/24 115 kg (253 lb 4.8 oz)      BP Readings from Last 3 Encounters:   12/20/24 111/75   12/18/24 118/83   07/05/24 105/76        Class 2 Severe Obesity (BMI >=35 and <=39.9). Obesity-related health conditions include the following: dyslipidemias. Obesity is newly identified. BMI is is above average; BMI management plan is completed. We discussed portion control and increasing exercise.        Physical Exam  Neck:      Thyroid: No thyromegaly.      Vascular: No carotid bruit.   Cardiovascular:      Rate and Rhythm: Normal rate and regular rhythm.      Pulses: Normal pulses.      Heart sounds: Normal heart sounds.   Pulmonary:      Effort: Pulmonary effort is normal.      Breath sounds: Normal breath sounds.   Abdominal:      General: Abdomen is flat. Bowel sounds are normal.      Palpations: Abdomen is soft.   Lymphadenopathy:      Cervical: No cervical adenopathy.   Neurological:      General: No focal deficit present.      Mental Status: He is alert and oriented to person, place, and time.   Psychiatric:         Mood and Affect: Mood normal.         Behavior: Behavior normal.         Physical Exam  Lungs were auscultated.      Result Review : "       Common labs          12/18/2024    14:40   Common Labs   Glucose 95    BUN 14    Creatinine 1.12    Sodium 138    Potassium 4.1    Chloride 101    Calcium 9.6    Albumin 4.5    Total Bilirubin 0.6    Alkaline Phosphatase 76    AST (SGOT) 26    ALT (SGPT) 42    WBC 8.19    Hemoglobin 16.2    Hematocrit 50.0    Platelets 238        Results  Imaging  CT scan shows fatty liver.                 Assessment and Plan      Assessment & Plan  1. Hepatic steatosis.  His liver enzymes are predominantly within normal limits, with only one parameter slightly elevated. A CT scan confirms the presence of fatty liver. He is advised to abstain from alcohol, maintain adequate hydration, and avoid excessive use of Tylenol. A lipid panel will be ordered to assess his cholesterol levels, given his diagnosis of hepatic steatosis. A repeat hepatic function panel will be scheduled in 3 months to monitor stability. He is also advised to undergo screening for diabetes due to a family history of the condition. If the hepatic function panel indicates increasing liver enzymes, a referral to gastroenterology will be considered.    2. Floating rib pain.  His pain management is challenging due to the need to avoid systemic medications that could impact his liver. A prescription for Voltaren gel will be provided to manage his pain topically.    3. Psychiatric issues.  He has a history of ADHD, anxiety, bipolar 2 disorder, oppositional defiant disorder, and separation anxiety disorder. He is not interested in any psychiatric medications at this time.    4. Alcohol dependence.  He has been sober for 6 months. A prescription for disulfiram will be provided, with instructions to take it consistently at the same time each day.    5. Cancer risk.  Given his family history of various cancers, there is a concern for potential genetic predisposition. A referral to genetics will be made for a comprehensive genetic panel.    Follow-up  The patient will  follow up in 3 months.    Diagnoses and all orders for this visit:    1. Tobacco use (Primary)    2. Hepatic steatosis  -     Hepatic Function Panel; Future  -     Lipid panel; Future    3. Finding of floating rib  -     Diclofenac Sodium (VOLTAREN) 1 % gel gel; Apply 4 g topically to the appropriate area as directed 4 (Four) Times a Day As Needed (pain).  Dispense: 100 g; Refill: 0    4. Attention deficit hyperactivity disorder (ADHD), combined type    5. History of alcoholism  -     disulfiram (ANTABUSE) 250 MG tablet; Take 1 tablet by mouth Daily.  Dispense: 30 tablet; Refill: 2    6. Bipolar disorder with depression    7. Oppositional defiant disorder    8. Separation anxiety disorder    9. Need for hepatitis C screening test  -     Hepatitis C Antibody; Future    10. Screening for thyroid disorder  -     TSH Rfx On Abnormal To Free T4    11. Family history of diabetes mellitus  -     Hemoglobin A1c    12. Family history of cancer  -     Ambulatory Referral to Genetic Counseling/Testing    13. Family history of thyroid cancer  -     Ambulatory Referral to Genetic Counseling/Testing    14. Family history of lung cancer  -     Ambulatory Referral to Genetic Counseling/Testing    15. Family history of brain cancer  -     Ambulatory Referral to Genetic Counseling/Testing    16. Family history of colon cancer  -     Ambulatory Referral to Genetic Counseling/Testing    17. Family history of leukemia  -     Ambulatory Referral to Genetic Counseling/Testing          Follow Up     Return in about 3 months (around 3/20/2025) for liver follow up and labs .    Updated annual wellness visit checklist.  Immunizations discussed.  Screening discussed and/or ordered.  Recommend yearly dental and eye exams. Also discussed monitoring of blood pressure and lipids.      Patient was given instructions and counseling regarding his condition or for health maintenance advice. Please see specific information pulled into the AVS if  appropriate.     Patient or patient representative verbalized consent for the use of Ambient Listening during the visit with  CYNDY Rodrigez for chart documentation. 12/20/2024  10:22 EST    CYNDY Rodrigez

## 2025-01-03 LAB — GENETIC SCN SPEC: NORMAL

## 2025-01-30 ENCOUNTER — TELEPHONE (OUTPATIENT)
Dept: FAMILY MEDICINE CLINIC | Facility: CLINIC | Age: 20
End: 2025-01-30

## 2025-01-30 ENCOUNTER — OFFICE VISIT (OUTPATIENT)
Dept: FAMILY MEDICINE CLINIC | Facility: CLINIC | Age: 20
End: 2025-01-30
Payer: COMMERCIAL

## 2025-01-30 VITALS
WEIGHT: 269.2 LBS | BODY MASS INDEX: 37.69 KG/M2 | HEART RATE: 101 BPM | DIASTOLIC BLOOD PRESSURE: 55 MMHG | SYSTOLIC BLOOD PRESSURE: 91 MMHG | HEIGHT: 71 IN | OXYGEN SATURATION: 100 %

## 2025-01-30 DIAGNOSIS — L03.012 CELLULITIS OF FINGER OF LEFT HAND: ICD-10-CM

## 2025-01-30 DIAGNOSIS — R05.1 ACUTE COUGH: ICD-10-CM

## 2025-01-30 DIAGNOSIS — U07.1 COVID-19: ICD-10-CM

## 2025-01-30 DIAGNOSIS — J02.9 SORE THROAT: Primary | ICD-10-CM

## 2025-01-30 DIAGNOSIS — Z77.22 SECOND HAND SMOKE EXPOSURE: ICD-10-CM

## 2025-01-30 DIAGNOSIS — G43.709 CHRONIC MIGRAINE WITHOUT AURA WITHOUT STATUS MIGRAINOSUS, NOT INTRACTABLE: ICD-10-CM

## 2025-01-30 DIAGNOSIS — J98.01 BRONCHOSPASM: ICD-10-CM

## 2025-01-30 DIAGNOSIS — Z72.89 CURRENT EVERY DAY VAPING: ICD-10-CM
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
## 2025-01-30 LAB
EXPIRATION DATE: ABNORMAL
EXPIRATION DATE: NORMAL
FLUAV AG UPPER RESP QL IA.RAPID: NOT DETECTED
FLUBV AG UPPER RESP QL IA.RAPID: NOT DETECTED
INTERNAL CONTROL: ABNORMAL
INTERNAL CONTROL: NORMAL
Lab: ABNORMAL
Lab: NORMAL
S PYO AG THROAT QL: NEGATIVE
SARS-COV-2 AG UPPER RESP QL IA.RAPID: DETECTED

## 2025-01-30 PROCEDURE — 87880 STREP A ASSAY W/OPTIC: CPT

## 2025-01-30 PROCEDURE — 87428 SARSCOV & INF VIR A&B AG IA: CPT

## 2025-01-30 PROCEDURE — 99213 OFFICE O/P EST LOW 20 MIN: CPT

## 2025-01-30 RX ORDER — SULFAMETHOXAZOLE AND TRIMETHOPRIM 800; 160 MG/1; MG/1
1 TABLET ORAL 2 TIMES DAILY
Qty: 14 TABLET | Refills: 0 | Status: SHIPPED | OUTPATIENT
Start: 2025-01-30 | End: 2025-02-06

## 2025-01-30 RX ORDER — ATOGEPANT 60 MG/1
60 TABLET ORAL DAILY
Qty: 30 TABLET | Refills: 6 | Status: SHIPPED | OUTPATIENT
Start: 2025-01-30

## 2025-01-30 NOTE — PROGRESS NOTES
Chief Complaint   Patient presents with    Migraine    Cough    Sore Throat    Nasal Congestion        Subjective     Arnaldo Rebolledo  has a past medical history of ADHD, Alcoholism, Anxiety, Bipolar 2 disorder, major depressive episode, Bipolar depression, Fatty liver, Hepatic steatosis, MRSA (methicillin resistant staph aureus) culture positive, Oppositional defiant disorder, and Separation anxiety disorder.    HPI    History of Present Illness  The patient is a 20-year-old male who presents today with cough, migraine, and sore throat. He tested positive for COVID-19 today in the office.    He has been experiencing symptoms of COVID-19, including a stuffy nose, for the past 2 days. He reports no fever. He has been experiencing severe coughing spells, which have led to vomiting, starting approximately 1.5 to 2 weeks ago. He has a history of vaping and was previously advised to quit smoking due to his exposure to secondhand smoke from family members. He is considering resuming dipping as an alternative to vaping.    He has been experiencing migraines daily for over a month, a condition he also suffered from in his youth, which he attributes to caffeine consumption. He now consumes caffeine daily and maintains adequate hydration. He reports no aura preceding his migraines but does experience light sensitivity and occasional nausea. He has not been on any specific medication for migraines but has found minimal relief with ibuprofen and Tylenol in the past. He has also found diclofenac effective, which he takes once daily, occasionally twice if needed. He received a Toradol injection at urgent care earlier this month, which provided significant relief. He is currently asymptomatic but notes that his headaches tend to worsen as the day progresses. He recalls an incident where he had to call EMS due to a severe headache that lasted for 8 hours, which was attributed to a side effect of his alcohol medication.     He has a  history of cat bites and scratches, with one bite causing significant pain and swelling in his thumb.    SOCIAL HISTORY  The patient has been sober for 7 months. He vapes daily.        Allergies   Allergen Reactions    Red Dye Other (See Comments)     ,    Hydromorphone Hcl Nausea And Vomiting    Latex Rash    Morphine Nausea And Vomiting         Current Outpatient Medications:     diclofenac (VOLTAREN) 50 MG EC tablet, Take 1 tablet by mouth 2 (Two) Times a Day., Disp: 60 tablet, Rfl: 2    disulfiram (ANTABUSE) 250 MG tablet, Take 1 tablet by mouth Daily., Disp: 30 tablet, Rfl: 2    famotidine (PEPCID) 20 MG tablet, Take 1 tablet by mouth 2 (Two) Times a Day., Disp: 30 tablet, Rfl: 0    ondansetron ODT (ZOFRAN-ODT) 4 MG disintegrating tablet, Place 1 tablet on the tongue Every 8 (Eight) Hours As Needed for Nausea or Vomiting., Disp: 15 tablet, Rfl: 0    There is no problem list on file for this patient.       Past Surgical History:   Procedure Laterality Date    INGUINAL HERNIA REPAIR Right     KNEE DEBRIDEMENT Left     WRIST SURGERY Right        Social History     Socioeconomic History    Marital status: Single   Tobacco Use    Smoking status: Never    Smokeless tobacco: Former     Types: Snuff     Quit date: 2023   Vaping Use    Vaping status: Every Day    Substances: Nicotine, Flavoring    Devices: Disposable   Substance and Sexual Activity    Alcohol use: Not Currently     Comment: 2 1/2 months sober    Drug use: Not Currently     Types: Marijuana    Sexual activity: Defer       Family History   Problem Relation Age of Onset    Diabetes Mother     Heart disease Father     Stroke Paternal Grandfather        Family history, surgical history, past medical history, Allergies and med's reviewed with patient today and updated in [email or web address] EMR.     ROS:  Review of Systems   HENT:  Positive for congestion and sore throat.    Respiratory:  Positive for cough.    Cardiovascular: Negative.    Gastrointestinal: Negative.   "  Genitourinary: Negative.    Skin:  Positive for wound.       OBJECTIVE:  Vitals:    01/30/25 0936   Weight: 122 kg (269 lb 3.2 oz)   Height: 180.3 cm (71\")     Body mass index is 37.55 kg/m².  No LMP for male patient.    Physical Exam  Cardiovascular:      Rate and Rhythm: Normal rate and regular rhythm.      Pulses: Normal pulses.      Heart sounds: Normal heart sounds.   Pulmonary:      Effort: Pulmonary effort is normal.      Breath sounds: Normal breath sounds.   Neurological:      General: No focal deficit present.      Mental Status: He is oriented to person, place, and time. Mental status is at baseline.         Physical Exam      Procedures            Health Maintenance Due   Topic Date Due    TDAP/TD VACCINES (1 - Tdap) Never done    HEPATITIS C SCREENING  Never done        No visits with results within 30 Day(s) from this visit.   Latest known visit with results is:   Lab on 12/19/2024   Component Date Value Ref Range Status    Result 12/19/2024 Comment   Final    Negative  Complete PDF report to be sent via [facility] [facility] or normal method of  delivery       Results  Laboratory Studies  Tested positive for Covid today in office.      ASSESSMENT/ PLAN:    There are no diagnoses linked to this encounter.    Assessment & Plan  1. COVID-19.  He tested positive for COVID-19 in the office today. Symptoms include cough, sore throat, and stuffy nose, which started 2 days ago. He is advised to abstain from work until his symptoms have shown improvement for a duration of 24 hours and he has been fever-free for the same period. A prescription for Paxlovid has been issued.    2. Migraine.  He experiences daily migraines, which have been persistent for the past month. He reports light sensitivity and occasional nausea. He is advised to continue taking CoQ10. A prescription for Qulipta, to be taken daily, has been issued for migraine prevention. Additionally, Ubrelvy has been prescribed for use at the onset of a " migraine. Prior authorization through insurance is required for these medications. He is also advised to avoid red dye, which triggers his headaches. If the insurance does not approve the new medications, older treatments will be considered.    3. Cat bite.  He has a swollen and warm thumb due to a cat bite, indicating a skin infection. A prescription for Bactrim has been issued.    4. Nicotine dependence.  He vapes daily and has a history of exposure to secondhand smoke. He is advised to quit vaping and avoid dipping due to the associated health risks. Nicotine gum has been recommended as a cessation aid. If nicotine gum is not sufficient, he can use nicotine patches in conjunction with the gum.    Orders Placed Today:     No orders of the defined types were placed in this encounter.     Your COVID-19 test result is positive.  You need to quarantine yourself until symptoms improving for 24 hours AND no fever with antipyretics for 24 hours.    If you develop emergency warning signs for COVID-19, get attention immediately.  Emergency warning signs include:  Severe trouble breathing  Persistent pain or pressure in the chest  If confusion or inability to wake  Bluish lips or face  *This list is not all inclusive    The CDC has guidance on COVID and other details.      Management Plan:     An After Visit Summary was printed and given to the patient at discharge.    Follow-up: No follow-ups on file.    Patient or patient representative verbalized consent for the use of Ambient Listening during the visit with  CYNDY Rodrigez for chart documentation. 1/30/2025  10:16 EST    CYNDY Rodrigez 1/30/2025 09:53 EST  This note was electronically signed.

## 2025-01-31 ENCOUNTER — DOCUMENTATION (OUTPATIENT)
Dept: FAMILY MEDICINE CLINIC | Facility: CLINIC | Age: 20
End: 2025-01-31
Payer: COMMERCIAL

## 2025-02-03 ENCOUNTER — TELEPHONE (OUTPATIENT)
Dept: FAMILY MEDICINE CLINIC | Facility: CLINIC | Age: 20
End: 2025-02-03
Payer: COMMERCIAL

## 2025-02-03 NOTE — TELEPHONE ENCOUNTER
Patient called office requesting an extension in work note due to still being sick 01/30/2025 - 02/02/2025. Patient was seen on 01/30/2025. Informed patient PCP MURRAY is out today 02/03/2025 and will give them a call back after speaking with PCP regarding work note.

## 2025-02-06 ENCOUNTER — CLINICAL SUPPORT (OUTPATIENT)
Dept: FAMILY MEDICINE CLINIC | Facility: CLINIC | Age: 20
End: 2025-02-06
Payer: COMMERCIAL

## 2025-02-06 DIAGNOSIS — G44.021 INTRACTABLE CHRONIC CLUSTER HEADACHE: Primary | ICD-10-CM

## 2025-02-06 PROCEDURE — 96372 THER/PROPH/DIAG INJ SC/IM: CPT

## 2025-02-06 RX ORDER — KETOROLAC TROMETHAMINE 30 MG/ML
30 INJECTION, SOLUTION INTRAMUSCULAR; INTRAVENOUS ONCE
Status: COMPLETED | OUTPATIENT
Start: 2025-02-06 | End: 2025-02-06

## 2025-02-06 RX ADMIN — KETOROLAC TROMETHAMINE 30 MG: 30 INJECTION, SOLUTION INTRAMUSCULAR; INTRAVENOUS at 11:31

## 2025-02-06 RX ADMIN — KETOROLAC TROMETHAMINE 30 MG: 30 INJECTION, SOLUTION INTRAMUSCULAR; INTRAVENOUS at 11:30

## 2025-02-10 ENCOUNTER — TELEPHONE (OUTPATIENT)
Dept: FAMILY MEDICINE CLINIC | Facility: CLINIC | Age: 20
End: 2025-02-10

## 2025-02-10 NOTE — TELEPHONE ENCOUNTER
Caller: Arnaldo Rebolledo    Relationship: Self    Best call back number: [phone]     What medication are you requesting: TORADOL     What are your current symptoms: HEADACHE    How long have you been experiencing symptoms: GET THEM EVERYDAY     Have you had these symptoms before:    [x] Yes  [] No    Have you been treated for these symptoms before:   [x] Yes  [] No    If a prescription is needed, what is your preferred pharmacy and phone number: [address] [phone] [facility] [phone] [facility]     Additional notes: PLEASE CALL AND ADVISE.

## 2025-02-11 NOTE — TELEPHONE ENCOUNTER
PATIENT CALLED STATING HE WOKE UP THIS MORNING WITH A HEADACHE AND WOULD LIKE TO KNOW IF THIS WILL BE FILLED.     PATIENT STATES HIS UBRELVY IS GONE.

## 2025-02-12 DIAGNOSIS — G43.709 CHRONIC MIGRAINE WITHOUT AURA WITHOUT STATUS MIGRAINOSUS, NOT INTRACTABLE: ICD-10-CM

## 2025-02-12 NOTE — TELEPHONE ENCOUNTER
Pt explained that he was taking the Ubrelvy at the onset of HA and a few hours later. Pt states that the Ubrelvy is not helping him. Pt states that he has gone back to taking tylenol when he knows he's not supposed to just to numb his headache so he can work. Pt has appt with you tomorrow and plans on talking to you about this.

## 2025-02-13 ENCOUNTER — OFFICE VISIT (OUTPATIENT)
Dept: FAMILY MEDICINE CLINIC | Facility: CLINIC | Age: 20
End: 2025-02-13
Payer: COMMERCIAL

## 2025-02-13 VITALS
WEIGHT: 272.8 LBS | DIASTOLIC BLOOD PRESSURE: 87 MMHG | SYSTOLIC BLOOD PRESSURE: 116 MMHG | OXYGEN SATURATION: 95 % | HEIGHT: 71 IN | BODY MASS INDEX: 38.19 KG/M2 | HEART RATE: 91 BPM

## 2025-02-13 DIAGNOSIS — G43.701 CHRONIC MIGRAINE WITHOUT AURA WITH STATUS MIGRAINOSUS, NOT INTRACTABLE: Primary | ICD-10-CM

## 2025-02-13 PROBLEM — K21.9 GASTROESOPHAGEAL REFLUX DISEASE WITHOUT ESOPHAGITIS: Status: ACTIVE | Noted: 2024-12-30

## 2025-02-13 PROBLEM — F31.9 BIPOLAR DEPRESSION: Chronic | Status: ACTIVE | Noted: 2024-12-30

## 2025-02-13 PROBLEM — F91.3 OPPOSITIONAL DEFIANT DISORDER: Status: ACTIVE | Noted: 2024-12-30

## 2025-02-13 PROBLEM — R29.898 FINDING OF FLOATING RIB: Status: ACTIVE | Noted: 2024-12-30

## 2025-02-13 PROBLEM — F90.8 OTHER SPECIFIED ATTENTION DEFICIT HYPERACTIVITY DISORDER (ADHD): Status: ACTIVE | Noted: 2024-12-30

## 2025-02-13 PROBLEM — F10.20 ALCOHOLISM: Chronic | Status: ACTIVE | Noted: 2024-12-30

## 2025-02-13 PROBLEM — E66.9 OBESITY (BMI 35.0-39.9 WITHOUT COMORBIDITY): Chronic | Status: ACTIVE | Noted: 2024-12-30

## 2025-02-13 PROBLEM — Z59.48 LACK OF ACCESS TO ADEQUATE FOOD: Status: ACTIVE | Noted: 2024-12-30

## 2025-02-13 PROCEDURE — 1160F RVW MEDS BY RX/DR IN RCRD: CPT

## 2025-02-13 PROCEDURE — 1159F MED LIST DOCD IN RCRD: CPT

## 2025-02-13 PROCEDURE — 96372 THER/PROPH/DIAG INJ SC/IM: CPT

## 2025-02-13 PROCEDURE — 99213 OFFICE O/P EST LOW 20 MIN: CPT

## 2025-02-13 RX ORDER — KETOROLAC TROMETHAMINE 30 MG/ML
30 INJECTION, SOLUTION INTRAMUSCULAR; INTRAVENOUS ONCE
Status: COMPLETED | OUTPATIENT
Start: 2025-02-13 | End: 2025-02-13

## 2025-02-13 RX ORDER — UBIDECARENONE 100 MG
200 CAPSULE ORAL DAILY
COMMUNITY

## 2025-02-13 RX ORDER — SUMATRIPTAN SUCCINATE 25 MG/1
TABLET ORAL
Qty: 12 TABLET | Refills: 5 | Status: SHIPPED | OUTPATIENT
Start: 2025-02-13

## 2025-02-13 RX ADMIN — KETOROLAC TROMETHAMINE 30 MG: 30 INJECTION, SOLUTION INTRAMUSCULAR; INTRAVENOUS at 09:38

## 2025-02-13 NOTE — PROGRESS NOTES
Chief Complaint   Patient presents with    Migraine     Ubrelvy not working for migraines. Pt states he is back to taking tylenol just to numb his headache. Pt states he's been getting dizzy and can't see straight.         Ac Rebolledo  has a past medical history of ADHD, Alcoholism, Anxiety, Bipolar 2 disorder, major depressive episode, Bipolar depression, Fatty liver, Hepatic steatosis, MRSA (methicillin resistant staph aureus) culture positive, Oppositional defiant disorder, and Separation anxiety disorder.    HPI    History of Present Illness  The patient is a 20-year-old male who presents today for a follow-up on migraines.    He reports experiencing a migraine during the office visit, which he attributes to the ineffectiveness of Ubrelvy. He has not been prescribed Qulipta for his migraines. His migraines have escalated in both frequency and severity, with the most recent episode occurring on Tuesday, characterized by photophobia and incapacitation. A subsequent episode on Wednesday was marked by dizziness, lightheadedness, and visual disturbances, including the need to squint his right eye to see clearly. His migraines are predominantly unilateral, affecting the right side. He has not undergone a vision exam recently. He has reduced his Tylenol intake from six to two tablets daily, which he finds beneficial for pain management and sleep induction. He has not experienced migraines of this severity since his early teens.    SOCIAL HISTORY  The patient has slowed down vaping and currently takes less than 100 puffs a day.        Allergies   Allergen Reactions    Red Dye Other (See Comments)     ,    Hydromorphone Hcl Nausea And Vomiting    Latex Rash    Morphine Nausea And Vomiting         Current Outpatient Medications:     Atogepant (Qulipta) 60 MG tablet, Take 1 tablet by mouth Daily., Disp: 30 tablet, Rfl: 6    coenzyme Q10 100 MG capsule, Take 2 capsules by mouth Daily., Disp: , Rfl:      disulfiram (ANTABUSE) 250 MG tablet, Take 1 tablet by mouth Daily., Disp: 30 tablet, Rfl: 2    ondansetron ODT (ZOFRAN-ODT) 4 MG disintegrating tablet, Place 1 tablet on the tongue Every 8 (Eight) Hours As Needed for Nausea or Vomiting., Disp: 15 tablet, Rfl: 0    SUMAtriptan (IMITREX) 25 MG tablet, Take one tablet at onset of headache. May repeat dose one time in 2 hours if headache not relieved., Disp: 12 tablet, Rfl: 5  No current facility-administered medications for this visit.    Patient Active Problem List   Diagnosis    Alcoholism    Bipolar depression    Finding of floating rib    Gastroesophageal reflux disease without esophagitis    Lack of access to adequate food    Obesity (BMI 35.0-39.9 without comorbidity)    Oppositional defiant disorder    Other specified attention deficit hyperactivity disorder (ADHD)        Past Surgical History:   Procedure Laterality Date    INGUINAL HERNIA REPAIR Right     KNEE DEBRIDEMENT Left     WRIST SURGERY Right        Social History     Socioeconomic History    Marital status: Single   Tobacco Use    Smoking status: Never    Smokeless tobacco: Former     Types: Snuff     Quit date: 2023   Vaping Use    Vaping status: Every Day    Substances: Nicotine, Flavoring    Devices: Disposable   Substance and Sexual Activity    Alcohol use: Not Currently     Comment: 2 1/2 months sober    Drug use: Not Currently     Types: Marijuana    Sexual activity: Defer       Family History   Problem Relation Age of Onset    Diabetes Mother     Heart disease Father     Stroke Paternal Grandfather        Family history, surgical history, past medical history, Allergies and med's reviewed with patient today and updated in [facility] EMR.     ROS:  Review of Systems   Respiratory: Negative.     Cardiovascular: Negative.    Neurological:  Positive for dizziness and headache.       OBJECTIVE:  Vitals:    02/13/25 0830   BP: 116/87   BP Location: Right arm   Patient Position: Sitting   Cuff Size: Large  "Adult   Pulse: 91   SpO2: 95%   Weight: 124 kg (272 lb 12.8 oz)   Height: 180.3 cm (71\")     Body mass index is 38.05 kg/m².  No LMP for male patient.    Physical Exam  Cardiovascular:      Rate and Rhythm: Normal rate and regular rhythm.      Pulses: Normal pulses.      Heart sounds: Normal heart sounds.   Pulmonary:      Effort: Pulmonary effort is normal.      Breath sounds: Normal breath sounds.   Neurological:      General: No focal deficit present.      Mental Status: He is oriented to person, place, and time. Mental status is at baseline.         Physical Exam  Lungs are clear.  Heart sounds are normal.    Procedures            Health Maintenance Due   Topic Date Due    HEPATITIS C SCREENING  Never done        Office Visit on 01/30/2025   Component Date Value Ref Range Status    SARS Antigen 01/30/2025 Detected (A)  Not Detected, Presumptive Negative Final    Influenza A Antigen DEANNE 01/30/2025 Not Detected  Not Detected Final    Influenza B Antigen DEANNE 01/30/2025 Not Detected  Not Detected Final    Internal Control 01/30/2025 Passed  Passed Final    Lot Number 01/30/2025 [id number]   Final    Expiration Date 01/30/2025 10/23/2025   Final    Rapid Strep A Screen 01/30/2025 Negative  Negative, VALID, INVALID, Not Performed Final    Internal Control 01/30/2025 Passed  Passed Final    Lot Number 01/30/2025 [id number]   Final    Expiration Date 01/30/2025 10/23/2025   Final       Results        ASSESSMENT/ PLAN:    Diagnoses and all orders for this visit:    1. Chronic migraine without aura with status migrainosus, not intractable (Primary)  -     SUMAtriptan (IMITREX) 25 MG tablet; Take one tablet at onset of headache. May repeat dose one time in 2 hours if headache not relieved.  Dispense: 12 tablet; Refill: 5  -     MRI Brain With & Without Contrast; Future  -     ketorolac (TORADOL) injection 30 mg        Assessment & Plan  1. Migraine headaches.  The patient reports that Ubrelvy is not effective in managing his " migraines. He has not received Qulipta from the pharmacy despite prior authorization. He experiences frequent and severe migraines, often accompanied by light sensitivity, dizziness, and vision issues. He has been using Tylenol to manage the pain but is aware of the potential liver risks. A Toradol injection will be administered today to provide immediate relief. He is advised to continue taking Tylenol as needed for pain but to avoid diclofenac and ibuprofen until tomorrow. A prescription for Imitrex will be sent to [facility] pharmacy for abortive therapy, to be taken at the onset of a migraine and repeated after 2 hours if necessary. He is instructed to take Qulipta daily in the morning once it is available. An MRI of the brain will be ordered pending insurance approval to rule out any underlying conditions. He is also advised to schedule a vision exam to ensure no ocular issues are contributing to his migraines. If the current treatment regimen proves ineffective within 3 to 4 weeks, a referral to neurology will be considered.    Orders Placed Today:     New Medications Ordered This Visit   Medications    SUMAtriptan (IMITREX) 25 MG tablet     Sig: Take one tablet at onset of headache. May repeat dose one time in 2 hours if headache not relieved.     Dispense:  12 tablet     Refill:  5    ketorolac (TORADOL) injection 30 mg        Management Plan:     An After Visit Summary was printed and given to the patient at discharge.    Follow-up: Return if symptoms worsen or fail to improve.    Patient or patient representative verbalized consent for the use of Ambient Listening during the visit with  CYNDY Rodrigez for chart documentation. 2/13/2025  09:22 EST    CYNDY Rodrigez 2/13/2025 10:04 EST  This note was electronically signed.

## 2025-02-14 ENCOUNTER — TELEPHONE (OUTPATIENT)
Dept: FAMILY MEDICINE CLINIC | Facility: CLINIC | Age: 20
End: 2025-02-14
Payer: COMMERCIAL

## 2025-02-14 ENCOUNTER — APPOINTMENT (OUTPATIENT)
Dept: CT IMAGING | Facility: HOSPITAL | Age: 20
End: 2025-02-14
Payer: COMMERCIAL

## 2025-02-14 ENCOUNTER — HOSPITAL ENCOUNTER (EMERGENCY)
Facility: HOSPITAL | Age: 20
Discharge: HOME | End: 2025-02-14
Attending: EMERGENCY MEDICINE
Payer: COMMERCIAL

## 2025-02-14 VITALS
SYSTOLIC BLOOD PRESSURE: 130 MMHG | TEMPERATURE: 98 F | WEIGHT: 269.4 LBS | HEART RATE: 81 BPM | HEIGHT: 71 IN | RESPIRATION RATE: 15 BRPM | DIASTOLIC BLOOD PRESSURE: 78 MMHG | BODY MASS INDEX: 37.72 KG/M2 | OXYGEN SATURATION: 98 %

## 2025-02-14 DIAGNOSIS — J32.9 SINUSITIS, UNSPECIFIED CHRONICITY, UNSPECIFIED LOCATION: Primary | ICD-10-CM

## 2025-02-14 PROCEDURE — 96375 TX/PRO/DX INJ NEW DRUG ADDON: CPT

## 2025-02-14 PROCEDURE — 25010000002 KETOROLAC TROMETHAMINE PER 15 MG: Performed by: EMERGENCY MEDICINE

## 2025-02-14 PROCEDURE — 96374 THER/PROPH/DIAG INJ IV PUSH: CPT

## 2025-02-14 PROCEDURE — 25010000002 DIPHENHYDRAMINE PER 50 MG: Performed by: EMERGENCY MEDICINE

## 2025-02-14 PROCEDURE — 70450 CT HEAD/BRAIN W/O DYE: CPT

## 2025-02-14 PROCEDURE — 25810000003 SODIUM CHLORIDE 0.9 % SOLUTION: Performed by: EMERGENCY MEDICINE

## 2025-02-14 PROCEDURE — 25010000002 METOCLOPRAMIDE PER 10 MG: Performed by: EMERGENCY MEDICINE

## 2025-02-14 PROCEDURE — 99284 EMERGENCY DEPT VISIT MOD MDM: CPT

## 2025-02-14 RX ORDER — KETOROLAC TROMETHAMINE 30 MG/ML
30 INJECTION, SOLUTION INTRAMUSCULAR; INTRAVENOUS ONCE
Status: COMPLETED | OUTPATIENT
Start: 2025-02-14 | End: 2025-02-14

## 2025-02-14 RX ORDER — DIPHENHYDRAMINE HYDROCHLORIDE 50 MG/ML
12.5 INJECTION INTRAMUSCULAR; INTRAVENOUS ONCE
Status: COMPLETED | OUTPATIENT
Start: 2025-02-14 | End: 2025-02-14

## 2025-02-14 RX ORDER — ACETAMINOPHEN 500 MG
1000 TABLET ORAL ONCE
Status: COMPLETED | OUTPATIENT
Start: 2025-02-14 | End: 2025-02-14

## 2025-02-14 RX ORDER — METOCLOPRAMIDE 10 MG/1
10 TABLET ORAL
Qty: 20 TABLET | Refills: 0 | Status: SHIPPED | OUTPATIENT
Start: 2025-02-14

## 2025-02-14 RX ORDER — DOXYCYCLINE HYCLATE 100 MG/1
100 TABLET, DELAYED RELEASE ORAL 2 TIMES DAILY
Qty: 20 TABLET | Refills: 0 | Status: SHIPPED | OUTPATIENT
Start: 2025-02-14

## 2025-02-14 RX ORDER — KETOROLAC TROMETHAMINE 10 MG/1
10 TABLET, FILM COATED ORAL EVERY 6 HOURS PRN
Qty: 15 TABLET | Refills: 0 | Status: SHIPPED | OUTPATIENT
Start: 2025-02-14

## 2025-02-14 RX ORDER — METOCLOPRAMIDE HYDROCHLORIDE 5 MG/ML
10 INJECTION INTRAMUSCULAR; INTRAVENOUS ONCE
Status: COMPLETED | OUTPATIENT
Start: 2025-02-14 | End: 2025-02-14

## 2025-02-14 RX ADMIN — METOCLOPRAMIDE 10 MG: 5 INJECTION, SOLUTION INTRAMUSCULAR; INTRAVENOUS at 15:32

## 2025-02-14 RX ADMIN — KETOROLAC TROMETHAMINE 30 MG: 30 INJECTION, SOLUTION INTRAMUSCULAR; INTRAVENOUS at 15:32

## 2025-02-14 RX ADMIN — SODIUM CHLORIDE 1000 ML: 9 INJECTION, SOLUTION INTRAVENOUS at 15:31

## 2025-02-14 RX ADMIN — DIPHENHYDRAMINE HYDROCHLORIDE 12.5 MG: 50 INJECTION, SOLUTION INTRAMUSCULAR; INTRAVENOUS at 15:32

## 2025-02-14 RX ADMIN — ACETAMINOPHEN 1000 MG: 500 TABLET ORAL at 15:30

## 2025-02-14 NOTE — TELEPHONE ENCOUNTER
Pt called stating that he took the Qulipta today and his migraine is worse now than ever before. Pt states he feels light headed, dizzy and like he is going to throw up. Per Susana, pt is to go to the hospital as there isn't anything else we can do in the office. I informed pt. Pt states that he is at work right now and his girlfriend is coming to pick him up.

## 2025-02-14 NOTE — Clinical Note
[facility] EMERGENCY ROOM  [address] [address]  Phone: [phone]  Fax: [phone]    Geraldine Bolanos accompanied Arnaldo Rebolledo to the emergency department on 2/14/2025. They may return to work on 02/15/2025.        Thank you for choosing [facility].    Xiao Cottrell MD

## 2025-02-14 NOTE — Clinical Note
[facility] EMERGENCY ROOM  [address] [address]  Phone: [phone]  Fax: [phone]    Arnaldo Rebolledo was seen and treated in our emergency department on 2/14/2025.  He may return to work on 02/15/2025.         Thank you for choosing [facility].    Xiao Cottrell MD

## 2025-02-14 NOTE — TELEPHONE ENCOUNTER
Patient called to let Susana know that he took the medication that she gave him. And he was feeling light headed, dizzy, sick, and his headache was worst. Susana advised patient to go to the ER

## 2025-02-19 ENCOUNTER — CLINICAL SUPPORT (OUTPATIENT)
Dept: GENETICS | Facility: HOSPITAL | Age: 20
End: 2025-02-19
Payer: COMMERCIAL

## 2025-02-19 DIAGNOSIS — Z80.1 FAMILY HISTORY OF LUNG CANCER: ICD-10-CM

## 2025-02-19 DIAGNOSIS — Z80.8 FAMILY HISTORY OF THYROID CANCER: ICD-10-CM

## 2025-02-19 DIAGNOSIS — Z13.79 GENETIC TESTING: Primary | ICD-10-CM

## 2025-02-19 DIAGNOSIS — Z80.3 FAMILY HISTORY OF BREAST CANCER: ICD-10-CM

## 2025-02-19 NOTE — PROGRESS NOTES
Arnaldo Rebolledo, a 20-year-old male, was seen for genetic counseling due to a family history of ovarian and colon cancer. Genetic counseling was provided by telephone. Mr. Rebolledo confirmed his name, date of birth, and that he was in [place] at the time of the appointment. He does not have a personal history of cancer. He has had a colonoscopy done in the past due to GI issues. He was interested in discussing his risk for a hereditary cancer syndrome. Mr. Rebolledo opted to pursue comprehensive testing via the CancerNext Expanded panel ordered through [facility] which includes 85 genes associated with an increased risk of cancer. Results are expected in 2-3 weeks once the sample has been received.     PERTINENT FAMILY HISTORY: (See attached pedigree)   Mat Aunt:  Breast cancer, <50  Mat Grandfather: Cancer, unknown type  Mat Grandmother: Lung cancer  Pat Great Aunt: Thyroid cancer    Mr. Rebolledo also stated he has several great aunts and uncles and great grandparents on both sides of the family who have a history of many different types of cancer. Records regarding the family history were not available for review.    RISK ASSESSMENT:  Mr. Rebolledo's family history of cancer led to concern for a hereditary cancer syndrome. Mr. Rebolledo meets NCCN guidelines criteria for BRCA1/2 testing based on his maternal aunt's history of breast cancer under age 50. These risk assessments are based on the family history information provided at the time of the appointment and could change in the future should new information be obtained.    GENETIC COUNSELING: (30 minutes) We reviewed the family history information in detail. Cases of cancer follow three general patterns: sporadic, familial, and hereditary. While most cancer is sporadic, some cases appear to occur in family clusters. These cases are said to be familial and account for 10-20% of cancer cases. Familial cases may be due to a combination of shared genes and environmental factors  among family members. In even fewer families, the cancer is said to be inherited, and the genes responsible for the cancer are known.      Family histories typical of hereditary cancer syndromes usually include multiple first- and second-degree relatives diagnosed with cancer types that define a syndrome. These cases tend to be diagnosed at younger-than-expected ages and can be bilateral or multifocal. The cancer in these families follows an autosomal dominant inheritance pattern, which indicates the likely presence of a mutation in a cancer susceptibility gene. Children and siblings of an individual believed to carry this mutation have a 50% chance of inheriting that mutation, thereby inheriting the increased risk to develop cancer. These mutations can be passed down from the maternal or the paternal lineage.    Hereditary ovarian cancer accounts for approximately 10% of all cases of breast cancer. A significant proportion of hereditary breast and ovarian cancer can be attributed to mutations in the BRCA1 and BRCA2 genes. Mutations in these genes confer an increased risk for breast cancer, ovarian cancer, male breast cancer, prostate cancer, and pancreatic cancer.     There are other genes that are known to be associated with an increased risk for cancer. Some of these genes have well defined cancer risks and established management guidelines. Other genes that can be tested for have been more recently described, and there may be less data regarding the risks and therefore may not have established management guidelines. We discussed these limitations at length. In order to get as much information as possible regarding Mr. Rebolledo's personal risks and potential risks for his family, he opted to pursue testing through a panel that would look at several other genes known to increase the risk for cancer.    GENETIC TESTING:  The risks, benefits and limitations of genetic testing and implications for clinical management  following testing were reviewed. DNA test results can influence decisions regarding screening, prevention and surgical management. Genetic testing can have significant psychological implications for both individuals and families. Also discussed was the possibility of employment and insurance discrimination based on genetic test results and the laws in place to prevent this ([place]).    We discussed panel testing, which would involve testing for 85 genes that are associated with increased cancer risk. The benefits and limitations of genetic testing were discussed, and Mr. Rebolledo decided to pursue testing via the panel. The implications of a positive or negative test result were discussed. We discussed the possibility that, in some cases, genetic test results may be informative or may be ambiguous due to the identification of a genetic variant. These variants may or may not be associated with an increased cancer risk. With multigene panel testing, it is not uncommon for a variant of uncertain significance (VUS) to be identified. If a VUS is identified, testing family members is typically not recommended and screening recommendations are made based on the family history. The laboratories that perform genetic testing work to reclassify the VUS and send out an amended report if and when a VUS is reclassified. The majority of variant findings are ultimately reclassified to a negative result. Given his personal history, a negative test result would not eliminate all cancer risk to his relatives, although the risk would not be as high as it would with positive genetic testing.      Total time spent caring for the patient today was 45 minutes. This time includes chart review, time spent during the visit, and time spent after the visit on documentation and follow-up.    PLAN:  Genetic testing was ordered through [facility]. We will schedule a blood draw for him at [facility]. Results are expected in 2-3 weeks once the sample  has been received. He is welcome to call in the meantime with any questions or concerns at [phone].      Gemini Long MS, [facility], [facility]  Licensed Certified Genetic Counselor

## 2025-02-27 ENCOUNTER — LAB (OUTPATIENT)
Dept: ONCOLOGY | Facility: HOSPITAL | Age: 20
End: 2025-02-27
Payer: COMMERCIAL

## 2025-02-27 DIAGNOSIS — D64.9 ANEMIA, UNSPECIFIED TYPE: Primary | ICD-10-CM

## 2025-03-13 ENCOUNTER — TELEPHONE (OUTPATIENT)
Dept: GENETICS | Facility: HOSPITAL | Age: 20
End: 2025-03-13
Payer: COMMERCIAL

## 2025-03-13 ENCOUNTER — DOCUMENTATION (OUTPATIENT)
Dept: GENETICS | Facility: HOSPITAL | Age: 20
End: 2025-03-13
Payer: COMMERCIAL

## 2025-03-13 NOTE — TELEPHONE ENCOUNTER
Spoke with patient and disclosed negative genetic results. Informed patient these results would be on [facility] and sent to their Dr. Patient requested a follow-up call from a genetic counselor for some further questions.

## 2025-03-13 NOTE — PROGRESS NOTES
Arnaldo Rebolledo, a 20-year-old male, was seen for genetic counseling due to a family history of ovarian and colon cancer. Genetic counseling was provided by telephone. Mr. Rebolledo confirmed his name, date of birth, and that he was in [place] at the time of the appointment. He does not have a personal history of cancer. He has had a colonoscopy done in the past due to GI issues. He was interested in discussing his risk for a hereditary cancer syndrome. Mr. Rebolledo opted to pursue comprehensive testing via the CancerNext Expanded panel ordered through [facility] which includes 85 genes associated with an increased risk of cancer. Genetic testing was negative for deleterious mutations in the 85 genes included on this panel (see attached results). These normal results were discussed with Mr. Rebolledo by telephone on 3/13/25.    PERTINENT FAMILY HISTORY: (See attached pedigree)   Mat Aunt:  Breast cancer, <50  Mat Grandfather: Cancer, unknown type  Mat Grandmother: Lung cancer  Pat Great Aunt: Thyroid cancer    Mr. Rebolledo also stated he has several great aunts and uncles and great grandparents on both sides of the family who have a history of many different types of cancer. Records regarding the family history were not available for review.    RISK ASSESSMENT:  Mr. Rebolledo's family history of cancer led to concern for a hereditary cancer syndrome. Mr. Rebolledo meets NCCN guidelines criteria for BRCA1/2 testing based on his maternal aunt's history of breast cancer under age 50. These risk assessments are based on the family history information provided at the time of the appointment and could change in the future should new information be obtained.    GENETIC COUNSELING: (30 minutes) We reviewed the family history information in detail. Cases of cancer follow three general patterns: sporadic, familial, and hereditary. While most cancer is sporadic, some cases appear to occur in family clusters. These cases are said to be familial  and account for 10-20% of cancer cases. Familial cases may be due to a combination of shared genes and environmental factors among family members. In even fewer families, the cancer is said to be inherited, and the genes responsible for the cancer are known.      Family histories typical of hereditary cancer syndromes usually include multiple first- and second-degree relatives diagnosed with cancer types that define a syndrome. These cases tend to be diagnosed at younger-than-expected ages and can be bilateral or multifocal. The cancer in these families follows an autosomal dominant inheritance pattern, which indicates the likely presence of a mutation in a cancer susceptibility gene. Children and siblings of an individual believed to carry this mutation have a 50% chance of inheriting that mutation, thereby inheriting the increased risk to develop cancer. These mutations can be passed down from the maternal or the paternal lineage.    Hereditary ovarian cancer accounts for approximately 10% of all cases of breast cancer. A significant proportion of hereditary breast and ovarian cancer can be attributed to mutations in the BRCA1 and BRCA2 genes. Mutations in these genes confer an increased risk for breast cancer, ovarian cancer, male breast cancer, prostate cancer, and pancreatic cancer.     There are other genes that are known to be associated with an increased risk for cancer. Some of these genes have well defined cancer risks and established management guidelines. Other genes that can be tested for have been more recently described, and there may be less data regarding the risks and therefore may not have established management guidelines. We discussed these limitations at length. In order to get as much information as possible regarding Mr. Rebolledo's personal risks and potential risks for his family, he opted to pursue testing through a panel that would look at several other genes known to increase the risk for  cancer.    GENETIC TESTING:  The risks, benefits and limitations of genetic testing and implications for clinical management following testing were reviewed. DNA test results can influence decisions regarding screening, prevention and surgical management. Genetic testing can have significant psychological implications for both individuals and families. Also discussed was the possibility of employment and insurance discrimination based on genetic test results and the laws in place to prevent this ([place]).    We discussed panel testing, which would involve testing for 85 genes that are associated with increased cancer risk. The benefits and limitations of genetic testing were discussed, and Mr. Rebolledo decided to pursue testing via the panel. The implications of a positive or negative test result were discussed. We discussed the possibility that, in some cases, genetic test results may be informative or may be ambiguous due to the identification of a genetic variant. These variants may or may not be associated with an increased cancer risk. With multigene panel testing, it is not uncommon for a variant of uncertain significance (VUS) to be identified. If a VUS is identified, testing family members is typically not recommended and screening recommendations are made based on the family history. The laboratories that perform genetic testing work to reclassify the VUS and send out an amended report if and when a VUS is reclassified. The majority of variant findings are ultimately reclassified to a negative result. Given his personal history, a negative test result would not eliminate all cancer risk to his relatives, although the risk would not be as high as it would with positive genetic testing.      TEST RESULTS: Genetic testing was negative for known deleterious mutations by sequencing and rearrangement and RNA analysis testing of the 85 genes included on the CancerNext Expanded panel. This negative result greatly lowers  the risk of a hereditary cancer syndrome for Mr. Rebolledo. It is possible that the family history is due to a hereditary cancer syndrome that Mr. Rebolledo did not happen to inherit. Other relatives could still consider genetic testing. This assessment is based on the information provided at the time of the consultation.    PLAN:  Genetic counseling remains available for Mr. Rebolledo and his family. If he has any questions or concerns in the future, he is welcome to contact us at [phone].      Gemini Long MS, [facility], [facility]  Licensed Certified Genetic Counselor       Cc: CYNDY Gomez

## 2025-03-20 ENCOUNTER — OFFICE VISIT (OUTPATIENT)
Dept: FAMILY MEDICINE CLINIC | Facility: CLINIC | Age: 20
End: 2025-03-20
Payer: COMMERCIAL

## 2025-03-20 VITALS
DIASTOLIC BLOOD PRESSURE: 72 MMHG | WEIGHT: 271.8 LBS | HEART RATE: 90 BPM | BODY MASS INDEX: 38.05 KG/M2 | TEMPERATURE: 98.2 F | HEIGHT: 71 IN | SYSTOLIC BLOOD PRESSURE: 109 MMHG | OXYGEN SATURATION: 96 %

## 2025-03-20 DIAGNOSIS — R19.8 ALTERNATING CONSTIPATION AND DIARRHEA: Primary | ICD-10-CM

## 2025-03-20 DIAGNOSIS — F39 MOOD DISORDER: ICD-10-CM

## 2025-03-20 DIAGNOSIS — F10.21 HISTORY OF ALCOHOLISM: ICD-10-CM

## 2025-03-20 DIAGNOSIS — F31.9 BIPOLAR DISORDER WITH DEPRESSION: ICD-10-CM

## 2025-03-20 DIAGNOSIS — F93.0 SEPARATION ANXIETY DISORDER: ICD-10-CM

## 2025-03-20 DIAGNOSIS — F91.3 OPPOSITIONAL DEFIANT DISORDER: ICD-10-CM

## 2025-03-20 DIAGNOSIS — K92.1 BLOOD IN STOOL: ICD-10-CM

## 2025-03-20 DIAGNOSIS — F90.2 ATTENTION DEFICIT HYPERACTIVITY DISORDER (ADHD), COMBINED TYPE: ICD-10-CM

## 2025-03-20 RX ORDER — RISPERIDONE 1 MG/1
1 TABLET ORAL 2 TIMES DAILY
Qty: 60 TABLET | Refills: 1 | Status: SHIPPED | OUTPATIENT
Start: 2025-03-20

## 2025-03-20 NOTE — PROGRESS NOTES
Chief Complaint  Follow-up (3 months), ADD, Anxiety, Depression, Manic Behavior, Alcohol Problem, Floating Rib Pain, and Hepatic Steatosis    SUBJECTIVE  Arnaldo Rebolledo presents to [facility]    Attention Deficit Disorder:  Patient is not currently taking medication, with good control of symptoms.      Anxiety/Depression/Bipolar:  Patient not currently taking medication and feels like he has good control of symptoms.  Patient denies suicidal ideations or thoughts of harming himself or others.    ETOH abuse:  Patient given Rx for Disulfiram at last office visit.  Patient states medication has helped curb his cravings.    Floating Rib Pain:  Patient was unable to get Diclofenac Gel due to insurance company not covering OTC medications.  Patient is taking Diclofenac Tabs PRN.  He does note he was told he could not take this medication with the meds that were prescribed for his migraines.    Migraine Headaches:  Patient is taking Qulipta, Ketorolac, Sumatriptan, with good control of headaches.  Patient reports having a lot of migraines per month, each lasting a different amount of time.    Hepatic Steatosis:  Patient due for repeat labs.    History of Present Illness  Past Medical History:   Diagnosis Date    ADHD     Alcoholism     Anxiety     Bipolar 2 disorder, major depressive episode     Bipolar depression     Fatty liver     Hepatic steatosis     MRSA (methicillin resistant staph aureus) culture positive     Right Knee    Oppositional defiant disorder     Separation anxiety disorder       Family History   Problem Relation Age of Onset    Diabetes Mother     Heart disease Father     Lung cancer Maternal Grandmother     Cancer Maternal Grandfather     Stroke Paternal Grandfather     Breast cancer Maternal Aunt         Dx <50    Thyroid cancer Paternal Aunt       Past Surgical History:   Procedure Laterality Date    INGUINAL HERNIA REPAIR Right     KNEE DEBRIDEMENT Left     WRIST  "SURGERY Right         Current Outpatient Medications:     Atogepant (Qulipta) 60 MG tablet, Take 1 tablet by mouth Daily., Disp: 30 tablet, Rfl: 6    coenzyme Q10 100 MG capsule, Take 2 capsules by mouth Daily., Disp: , Rfl:     diclofenac (VOLTAREN) 50 MG EC tablet, Take 1 tablet by mouth 2 (Two) Times a Day As Needed (Rib Pain)., Disp: , Rfl:     disulfiram (ANTABUSE) 250 MG tablet, Take 1 tablet by mouth Daily., Disp: 30 tablet, Rfl: 2    ketorolac (TORADOL) 10 MG tablet, Take 1 tablet by mouth Every 6 (Six) Hours As Needed for Moderate Pain., Disp: 15 tablet, Rfl: 0    ondansetron ODT (ZOFRAN-ODT) 4 MG disintegrating tablet, Place 1 tablet on the tongue Every 8 (Eight) Hours As Needed for Nausea or Vomiting., Disp: 15 tablet, Rfl: 0    SUMAtriptan (IMITREX) 25 MG tablet, Take one tablet at onset of headache. May repeat dose one time in 2 hours if headache not relieved., Disp: 12 tablet, Rfl: 5    risperiDONE (RisperDAL) 1 MG tablet, Take 1 tablet by mouth 2 (Two) Times a Day., Disp: 60 tablet, Rfl: 1    OBJECTIVE  Vital Signs:   /72 (BP Location: Left arm, Patient Position: Sitting, Cuff Size: Large Adult)   Pulse 90   Temp 98.2 °F (36.8 °C) (Temporal)   Ht 180.3 cm (71\")   Wt 123 kg (271 lb 12.8 oz)   SpO2 96%   BMI 37.91 kg/m²    Estimated body mass index is 37.91 kg/m² as calculated from the following:    Height as of this encounter: 180.3 cm (71\").    Weight as of this encounter: 123 kg (271 lb 12.8 oz).     Wt Readings from Last 3 Encounters:   03/20/25 123 kg (271 lb 12.8 oz)   02/14/25 122 kg (269 lb 6.4 oz)   02/13/25 124 kg (272 lb 12.8 oz)     BP Readings from Last 3 Encounters:   03/20/25 109/72   02/14/25 130/78   02/13/25 116/87       Physical Exam  Cardiovascular:      Rate and Rhythm: Normal rate and regular rhythm.      Pulses: Normal pulses.      Heart sounds: Normal heart sounds.   Pulmonary:      Effort: Pulmonary effort is normal.      Breath sounds: Normal breath sounds. "   Neurological:      General: No focal deficit present.   Psychiatric:         Judgment: Judgment is impulsive.          Result Review        CT Head Without Contrast  Result Date: 2/14/2025  Impression: 1.No acute intracranial process identified. 2.Moderate frothy paranasal sinus mucosal disease. Correlate for acute sinusitis. Electronically Signed: Vinny Castro MD  2/14/2025 4:07 PM EST  Workstation ID: [id number]    CT Abdomen Pelvis With Contrast  Result Date: 12/18/2024  Impression: 1. No acute CT abnormality of the abdomen or pelvis. 2. Hepatic steatosis. Electronically Signed: Sofy Prakash MD  12/18/2024 5:26 PM EST  Workstation ID: [id number]        The above data has been reviewed by CYNDY Rodrigez 03/20/2025 15:38 EDT.          Patient Care Team:  Susana Walsh APRN as PCP - General (Nurse Practitioner)            ASSESSMENT & PLAN    Diagnoses and all orders for this visit:    1. Alternating constipation and diarrhea (Primary)    2. Blood in stool    3. Mood disorder  -     risperiDONE (RisperDAL) 1 MG tablet; Take 1 tablet by mouth 2 (Two) Times a Day.  Dispense: 60 tablet; Refill: 1    4. Attention deficit hyperactivity disorder (ADHD), combined type  -     Ambulatory Referral to Psychiatry    5. Bipolar disorder with depression  -     Ambulatory Referral to Psychiatry    6. Oppositional defiant disorder  Overview:  Declines medications        Last Assessment & Plan:     Condition: stable        Source of diagnosis: Diagnosis confirmed from PCP record and currently active        Follow up in: three months    Orders:  -     Ambulatory Referral to Psychiatry    7. Separation anxiety disorder  -     Ambulatory Referral to Psychiatry    8. History of alcoholism  -     Ambulatory Referral to Psychiatry         Tobacco Use: Medium Risk (3/20/2025)    Patient History     Smoking Tobacco Use: Never     Smokeless Tobacco Use: Former     Passive Exposure: Not on file       Follow Up     Return in  about 6 weeks (around 5/1/2025) for mood disorder.      Patient was given instructions and counseling regarding his condition or for health maintenance advice. Please see specific information pulled into the AVS if appropriate.   I have reviewed information obtained and documented by others and I have confirmed the accuracy of this documented note.    Susana Walsh, APRN

## 2025-04-17 DIAGNOSIS — G43.701 CHRONIC MIGRAINE WITHOUT AURA WITH STATUS MIGRAINOSUS, NOT INTRACTABLE: Primary | ICD-10-CM

## 2025-04-17 RX ORDER — KETOROLAC TROMETHAMINE 10 MG/1
10 TABLET, FILM COATED ORAL EVERY 6 HOURS PRN
Qty: 15 TABLET | Refills: 0 | Status: SHIPPED | OUTPATIENT
Start: 2025-04-17

## 2025-04-18 ENCOUNTER — LAB (OUTPATIENT)
Dept: LAB | Facility: HOSPITAL | Age: 20
End: 2025-04-18
Payer: MEDICAID

## 2025-04-18 ENCOUNTER — OFFICE VISIT (OUTPATIENT)
Dept: GASTROENTEROLOGY | Facility: CLINIC | Age: 20
End: 2025-04-18
Payer: MEDICAID

## 2025-04-18 ENCOUNTER — CLINICAL SUPPORT (OUTPATIENT)
Dept: FAMILY MEDICINE CLINIC | Facility: CLINIC | Age: 20
End: 2025-04-18
Payer: MEDICAID

## 2025-04-18 VITALS
BODY MASS INDEX: 36.68 KG/M2 | OXYGEN SATURATION: 97 % | DIASTOLIC BLOOD PRESSURE: 69 MMHG | SYSTOLIC BLOOD PRESSURE: 109 MMHG | HEART RATE: 78 BPM | WEIGHT: 262 LBS | HEIGHT: 71 IN

## 2025-04-18 DIAGNOSIS — K76.0 HEPATIC STEATOSIS: Primary | ICD-10-CM

## 2025-04-18 DIAGNOSIS — K62.5 RECTAL BLEEDING: ICD-10-CM

## 2025-04-18 DIAGNOSIS — K76.0 HEPATIC STEATOSIS: ICD-10-CM

## 2025-04-18 DIAGNOSIS — G43.109 MIGRAINE AURA WITHOUT HEADACHE: Primary | ICD-10-CM

## 2025-04-18 DIAGNOSIS — K21.9 GASTROESOPHAGEAL REFLUX DISEASE, UNSPECIFIED WHETHER ESOPHAGITIS PRESENT: ICD-10-CM

## 2025-04-18 DIAGNOSIS — K59.00 CONSTIPATION, UNSPECIFIED CONSTIPATION TYPE: ICD-10-CM

## 2025-04-18 DIAGNOSIS — R10.9 ABDOMINAL CRAMPING: ICD-10-CM

## 2025-04-18 DIAGNOSIS — Z11.59 NEED FOR HEPATITIS C SCREENING TEST: ICD-10-CM

## 2025-04-18 LAB
ALBUMIN SERPL-MCNC: 4.5 G/DL (ref 3.5–5.2)
ALP SERPL-CCNC: 85 U/L (ref 39–117)
ALPHA-FETOPROTEIN: <2 NG/ML (ref 0–8.3)
ALT SERPL W P-5'-P-CCNC: 27 U/L (ref 1–41)
AST SERPL-CCNC: 20 U/L (ref 1–40)
BILIRUB CONJ SERPL-MCNC: 0.1 MG/DL (ref 0–0.3)
BILIRUB INDIRECT SERPL-MCNC: 0.3 MG/DL
BILIRUB SERPL-MCNC: 0.4 MG/DL (ref 0–1.2)
CERULOPLASMIN SERPL-MCNC: 21 MG/DL (ref 16–31)
CHOLEST SERPL-MCNC: 131 MG/DL (ref 0–200)
DEPRECATED RDW RBC AUTO: 40.3 FL (ref 37–54)
ERYTHROCYTE [DISTWIDTH] IN BLOOD BY AUTOMATED COUNT: 13.1 % (ref 12.3–15.4)
FERRITIN SERPL-MCNC: 166 NG/ML (ref 30–400)
HAV IGM SERPL QL IA: NORMAL
HBA1C MFR BLD: 5.1 % (ref 4.8–5.6)
HBV CORE IGM SERPL QL IA: NORMAL
HBV SURFACE AG SERPL QL IA: NORMAL
HCT VFR BLD AUTO: 49 % (ref 37.5–51)
HCV AB SER QL: NORMAL
HDLC SERPL-MCNC: 37 MG/DL (ref 40–60)
HGB BLD-MCNC: 16.1 G/DL (ref 13–17.7)
INR PPP: 1.02 (ref 0.86–1.15)
IRON 24H UR-MRATE: 89 MCG/DL (ref 59–158)
IRON SATN MFR SERPL: 26 % (ref 20–50)
LDLC SERPL CALC-MCNC: 76 MG/DL (ref 0–100)
LDLC/HDLC SERPL: 2.03 {RATIO}
MCH RBC QN AUTO: 28.1 PG (ref 26.6–33)
MCHC RBC AUTO-ENTMCNC: 32.9 G/DL (ref 31.5–35.7)
MCV RBC AUTO: 85.7 FL (ref 79–97)
PLATELET # BLD AUTO: 258 10*3/MM3 (ref 140–450)
PMV BLD AUTO: 10.3 FL (ref 6–12)
PROT SERPL-MCNC: 7.2 G/DL (ref 6–8.5)
PROTHROMBIN TIME: 13.8 SECONDS (ref 11.8–14.9)
RBC # BLD AUTO: 5.72 10*6/MM3 (ref 4.14–5.8)
TIBC SERPL-MCNC: 340 MCG/DL (ref 298–536)
TRANSFERRIN SERPL-MCNC: 228 MG/DL (ref 200–360)
TRIGL SERPL-MCNC: 95 MG/DL (ref 0–150)
TSH SERPL DL<=0.05 MIU/L-ACNC: 1.22 UIU/ML (ref 0.27–4.2)
VLDLC SERPL-MCNC: 18 MG/DL (ref 5–40)
WBC NRBC COR # BLD AUTO: 6.26 10*3/MM3 (ref 3.4–10.8)

## 2025-04-18 PROCEDURE — 1159F MED LIST DOCD IN RCRD: CPT

## 2025-04-18 PROCEDURE — 85610 PROTHROMBIN TIME: CPT

## 2025-04-18 PROCEDURE — 82525 ASSAY OF COPPER: CPT

## 2025-04-18 PROCEDURE — 83540 ASSAY OF IRON: CPT

## 2025-04-18 PROCEDURE — 82103 ALPHA-1-ANTITRYPSIN TOTAL: CPT

## 2025-04-18 PROCEDURE — 99214 OFFICE O/P EST MOD 30 MIN: CPT

## 2025-04-18 PROCEDURE — 80076 HEPATIC FUNCTION PANEL: CPT

## 2025-04-18 PROCEDURE — 86038 ANTINUCLEAR ANTIBODIES: CPT

## 2025-04-18 PROCEDURE — 80061 LIPID PANEL: CPT

## 2025-04-18 PROCEDURE — 80074 ACUTE HEPATITIS PANEL: CPT

## 2025-04-18 PROCEDURE — 1160F RVW MEDS BY RX/DR IN RCRD: CPT

## 2025-04-18 PROCEDURE — 84466 ASSAY OF TRANSFERRIN: CPT

## 2025-04-18 PROCEDURE — 83036 HEMOGLOBIN GLYCOSYLATED A1C: CPT

## 2025-04-18 PROCEDURE — 84165 PROTEIN E-PHORESIS SERUM: CPT

## 2025-04-18 PROCEDURE — 85027 COMPLETE CBC AUTOMATED: CPT

## 2025-04-18 PROCEDURE — 84155 ASSAY OF PROTEIN SERUM: CPT

## 2025-04-18 PROCEDURE — 82105 ALPHA-FETOPROTEIN SERUM: CPT

## 2025-04-18 PROCEDURE — 84443 ASSAY THYROID STIM HORMONE: CPT

## 2025-04-18 PROCEDURE — 86381 MITOCHONDRIAL ANTIBODY EACH: CPT

## 2025-04-18 PROCEDURE — 86334 IMMUNOFIX E-PHORESIS SERUM: CPT

## 2025-04-18 PROCEDURE — 82784 ASSAY IGA/IGD/IGG/IGM EACH: CPT

## 2025-04-18 PROCEDURE — 82728 ASSAY OF FERRITIN: CPT

## 2025-04-18 PROCEDURE — 36415 COLL VENOUS BLD VENIPUNCTURE: CPT

## 2025-04-18 PROCEDURE — 86015 ACTIN ANTIBODY EACH: CPT

## 2025-04-18 PROCEDURE — 82104 ALPHA-1-ANTITRYPSIN PHENO: CPT

## 2025-04-18 PROCEDURE — 82390 ASSAY OF CERULOPLASMIN: CPT

## 2025-04-18 RX ORDER — KETOROLAC TROMETHAMINE 30 MG/ML
60 INJECTION, SOLUTION INTRAMUSCULAR; INTRAVENOUS EVERY 6 HOURS PRN
Status: SHIPPED | OUTPATIENT
Start: 2025-04-18 | End: 2025-04-23

## 2025-04-18 RX ORDER — PANTOPRAZOLE SODIUM 40 MG/1
40 TABLET, DELAYED RELEASE ORAL DAILY
Qty: 30 TABLET | Refills: 3 | Status: SHIPPED | OUTPATIENT
Start: 2025-04-18

## 2025-04-18 RX ORDER — HYOSCYAMINE SULFATE 0.12 MG/1
0.12 TABLET ORAL
Qty: 120 TABLET | Refills: 5 | Status: SHIPPED | OUTPATIENT
Start: 2025-04-18

## 2025-04-18 RX ORDER — POLYETHYLENE GLYCOL 3350 17 G/17G
17 POWDER, FOR SOLUTION ORAL DAILY
Qty: 527 G | Refills: 5 | Status: SHIPPED | OUTPATIENT
Start: 2025-04-18

## 2025-04-18 RX ORDER — POLYETHYLENE GLYCOL 3350, SODIUM SULFATE, POTASSIUM CHLORIDE, MAGNESIUM SULFATE, AND SODIUM CHLORIDE FOR ORAL SOLUTION 178.7-7.3G
1 KIT ORAL TAKE AS DIRECTED
Qty: 1 EACH | Refills: 0 | Status: SHIPPED | OUTPATIENT
Start: 2025-04-18 | End: 2025-04-21

## 2025-04-18 RX ADMIN — KETOROLAC TROMETHAMINE 30 MG: 30 INJECTION, SOLUTION INTRAMUSCULAR; INTRAVENOUS at 11:06

## 2025-04-18 NOTE — PROGRESS NOTES
Chief Complaint     Fatty Liver, Constipation, Diarrhea (Altered bowel habits ), Nausea, and Vomiting    Patient or patient representative verbalized consent for the use of Ambient Listening during the visit with  CYNDY Vegas for chart documentation. 4/18/2025  11:22 EDT      History of Present Illness     Arnaldo Rebolledo is a 20 y.o. male who presents to [facility] on referral from Bhaskar Tucker PA-C for a gastroenterology evaluation of Hepatic Steatosis.      History of Present Illness      Patient presented to the GI office of Dr. Carbone for evaluation of fatty liver and altered bowel habits.  He was diagnosed with hepatic steatosis after CT of the abdomen and pelvis in December 2024.  Patient reports a heavy history of drinking alcohol since the age of 10.  Reports cessation of alcohol use for the past 10 months and is currently on disulfiram.  No reports of IV drug use, unprofessional tattoos.  Denies history of or exposure to hepatitis.  No reports of taking herbal medications.  Patient reports his father had a history of drinking alcohol and was diagnosed with cirrhosis.    Patient admits to experiencing acid reflux daily but denies taking any medication.  Experiences nausea with seldom vomiting for which she will take Zofran.  No reports of unintentional weight loss.  Patient reports recent change in his bowel habits.  He explains he he used to have regular bowel movements and now experiences constipation.  Patient has not started any medications to help the constipation.  Patient reports rectal bleeding but is unsure if it is related to the constipation.  He does report a history of external hemorrhoids.  Reports abdominal cramping.     12/18/2024 CT abdomen and pelvis with contrast: Hepatic steatosis.       History      Past Medical History:   Diagnosis Date    ADHD     Alcoholism     Anxiety     Bipolar 2 disorder, major depressive episode     Bipolar  depression     Fatty liver     Hepatic steatosis     MRSA (methicillin resistant staph aureus) culture positive     Right Knee    Oppositional defiant disorder     Separation anxiety disorder        Past Surgical History:   Procedure Laterality Date    INGUINAL HERNIA REPAIR Right     KNEE DEBRIDEMENT Left     WRIST SURGERY Right        Family History   Problem Relation Age of Onset    Diabetes Mother     Heart disease Father     Breast cancer Maternal Aunt         Dx <50    Thyroid cancer Paternal Aunt     Lung cancer Maternal Grandmother     Cancer Maternal Grandfather     Colon cancer Paternal Grandfather     Stroke Paternal Grandfather     Colon cancer Paternal Great-Grandfather         Current Medications        Current Outpatient Medications:     Atogepant (Qulipta) 60 MG tablet, Take 1 tablet by mouth Daily., Disp: 30 tablet, Rfl: 6    coenzyme Q10 100 MG capsule, Take 2 capsules by mouth Daily., Disp: , Rfl:     diclofenac (VOLTAREN) 50 MG EC tablet, Take 1 tablet by mouth 2 (Two) Times a Day As Needed (Rib Pain)., Disp: , Rfl:     disulfiram (ANTABUSE) 250 MG tablet, Take 1 tablet by mouth Daily., Disp: 30 tablet, Rfl: 2    ketorolac (TORADOL) 10 MG tablet, Take 1 tablet by mouth Every 6 (Six) Hours As Needed for Moderate Pain., Disp: 15 tablet, Rfl: 0    ondansetron ODT (ZOFRAN-ODT) 4 MG disintegrating tablet, Place 1 tablet on the tongue Every 8 (Eight) Hours As Needed for Nausea or Vomiting., Disp: 15 tablet, Rfl: 0    hyoscyamine (ANASPAZ,LEVSIN) 0.125 MG tablet, Take 1 tablet by mouth 4 (Four) Times a Day With Meals & at Bedtime., Disp: 120 tablet, Rfl: 5    pantoprazole (PROTONIX) 40 MG EC tablet, Take 1 tablet by mouth Daily., Disp: 30 tablet, Rfl: 3    PEG 3350-KCl-NaCl-NaSulf-MgSul (Suflave) 178.7 g reconstituted solution, Take 1 Box by mouth Take As Directed. TAKE PER OFFICE INSTRUCTIONS, Disp: 1 each, Rfl: 0    polyethylene glycol (MIRALAX) 17 GM/SCOOP powder, Take 17 g by mouth Daily., Disp: 527  "g, Rfl: 5    risperiDONE (RisperDAL) 1 MG tablet, Take 1 tablet by mouth 2 (Two) Times a Day. (Patient not taking: Reported on 4/18/2025), Disp: 60 tablet, Rfl: 1    SUMAtriptan (IMITREX) 25 MG tablet, Take one tablet at onset of headache. May repeat dose one time in 2 hours if headache not relieved. (Patient not taking: Reported on 4/18/2025), Disp: 12 tablet, Rfl: 5    Current Facility-Administered Medications:     ketorolac (TORADOL) injection 60 mg, 60 mg, Intramuscular, Q6H PRN, Bellis, Susana, APRN, 30 mg at 04/18/25 1106     Allergies     Allergies   Allergen Reactions    Red Dye Other (See Comments)     ,    Hydromorphone Hcl Nausea And Vomiting    Latex Rash    Morphine Nausea And Vomiting       Social History       Social History     Social History Narrative    Not on file       Immunizations     Immunization:    There is no immunization history on file for this patient.       Objective     Objective     Vital Signs:   /69 (BP Location: Left arm, Patient Position: Sitting, Cuff Size: Large Adult)   Pulse 78   Ht 180.3 cm (71\")   Wt 119 kg (262 lb)   SpO2 97%   BMI 36.54 kg/m²       Physical Exam  HENT:      Head: Normocephalic.   Cardiovascular:      Rate and Rhythm: Normal rate.   Pulmonary:      Effort: Pulmonary effort is normal.   Musculoskeletal:         General: Normal range of motion.   Neurological:      General: No focal deficit present.      Mental Status: He is alert.   Psychiatric:         Mood and Affect: Mood normal.                 Results      Result Review :   The following data was reviewed by: CYNDY Vegas on 04/18/2025:    Lab Results - Last 18 Months   Lab Units 12/18/24  1440   WBC 10*3/mm3 8.19   HEMOGLOBIN g/dL 16.2   MCV fL 84.2   PLATELETS 10*3/mm3 238         Lab Results - Last 18 Months   Lab Units 12/18/24  1440   BUN mg/dL 14   CREATININE mg/dL 1.12   SODIUM mmol/L 138   POTASSIUM mmol/L 4.1   CHLORIDE mmol/L 101   CO2 mmol/L 25.3   GLUCOSE mg/dL " "95      No results for input(s): \"PROTIME\", \"INR\", \"PTT\" in the last 84907 hours.  Lab Results - Last 18 Months   Lab Units 12/18/24  1440   AST (SGOT) U/L 26   ALT (SGPT) U/L 42*   ALK PHOS U/L 76   BILIRUBIN mg/dL 0.6   TOTAL PROTEIN g/dL 7.5   ALBUMIN g/dL 4.5      No results for input(s): \"IRON\", \"TRANSFERRIN\", \"TIBC\", \"FERRITIN\", \"TUJDWVMX30\", \"FOLATE\" in the last 73944 hours.  No results for input(s): \"HAV\", \"HEPAIGM\", \"HEPBIGM\", \"HEPBCAB\", \"HBEAG\", \"HEPCAB\" in the last 97584 hours.    CT Head Without Contrast  Result Date: 2/14/2025  Impression: 1.No acute intracranial process identified. 2.Moderate frothy paranasal sinus mucosal disease. Correlate for acute sinusitis. Electronically Signed: Vinny Castro MD  2/14/2025 4:07 PM EST  Workstation ID: [id number]      Results               Assessment and Plan        Assessment and Plan    Diagnoses and all orders for this visit:    1. Hepatic steatosis (Primary)  -     AFP Tumor Marker; Future  -     Alpha - 1 - Antitrypsin Phenotype; Future  -     ARVIND; Future  -     Hepatic Function Panel; Future  -     Hepatitis Panel, Acute; Future  -     Iron Profile; Future  -     Ferritin; Future  -     Copper, Serum; Future  -     Protime-INR; Future  -     Ceruloplasmin; Future  -     Mitochondrial Antibodies, M2; Future  -     Anti-Smooth Muscle Antibody Titer; Future  -     ALTON + PE; Future  -     Liver Elastography; Future  -     CBC (No Diff); Future  -     Cancel: Hepatic Function Panel; Future  -     Cancel: Protime-INR; Future  -     Protime-INR; Future  -     Hepatic Function Panel; Future    2. Gastroesophageal reflux disease, unspecified whether esophagitis present  -     pantoprazole (PROTONIX) 40 MG EC tablet; Take 1 tablet by mouth Daily.  Dispense: 30 tablet; Refill: 3    3. Constipation, unspecified constipation type  -     polyethylene glycol (MIRALAX) 17 GM/SCOOP powder; Take 17 g by mouth Daily.  Dispense: 527 g; Refill: 5    4. Rectal bleeding  -     " Case Request; Standing  -     Case Request  -     PEG 3350-KCl-NaCl-NaSulf-MgSul (Suflave) 178.7 g reconstituted solution; Take 1 Box by mouth Take As Directed. TAKE PER OFFICE INSTRUCTIONS  Dispense: 1 each; Refill: 0    5. BMI 36.0-36.9,adult    6. Abdominal cramping  -     hyoscyamine (ANASPAZ,LEVSIN) 0.125 MG tablet; Take 1 tablet by mouth 4 (Four) Times a Day With Meals & at Bedtime.  Dispense: 120 tablet; Refill: 5    Other orders  -     Follow Anesthesia Guidelines / Protocol; Standing  -     Follow Anesthesia Guidelines / Protocol; Future  -     Verify NPO; Standing  -     Verify Bowel Prep Was Successful; Standing  -     Give Tap Water Enema If Bowel Prep Insufficient; Standing          Assessment & Plan        COLONOSCOPY; A flexible tube with camera and light at the end will pass from the rear end to visualize the large bowel with possible removal of protruded tissue to send for testing. (N/A)      Follow Up        Follow Up   Return for Follow up after Colonoscopy.    I have recommended that the patient undergo further evaluation with a colonoscopy due to rectal bleeding and altered bowel habits.  I have discussed this procedure in detail with the patient.  I have discussed the risk, benefits and alternatives.  I have discussed the risk of anesthesia, bleeding and perforation.  Patient understands these risks, benefits and alternatives and wishes to proceed.  I will schedule him at his earliest convenience.  Patient agreeable to this plan and will call with any questions or concerns.    Hepatic lab workup ordered to determine cause of fatty liver. Differential diagnosis include but are not limited to viral hepatitis, autoimmune hepatitis, NAFLD, toxic hepatitis, vascular causes such as Budd Chiari or CHF, or hereditary causes such as hemochromatosis, alpha 1 antitrypsin deficiency, or Johnathon disease. Follow up Liver US  and Labs in 6 months. Will order FibroScan as noninvasive testing for fibrosis and  fatty liver.     Advised patient to maintain a healthy lifestyle: weight loss of 10%, cutting back on carbs, sugars, and fried fatty foods, limiting intake of soda and sugary drinks, and abstain from alcohol. Recommend 2 to 3 cups of black coffee a day. Advise patient to go on daily walks with 10,000 steps daily (as recommended for patients with NAFLD/IGLESIAS).     Patient was given instructions and counseling regarding his condition or for health maintenance advice. Please see specific information pulled into the AVS if appropriate.

## 2025-04-21 ENCOUNTER — TELEPHONE (OUTPATIENT)
Dept: GASTROENTEROLOGY | Facility: CLINIC | Age: 20
End: 2025-04-21
Payer: COMMERCIAL

## 2025-04-21 DIAGNOSIS — K62.5 RECTAL BLEEDING: Primary | ICD-10-CM

## 2025-04-21 LAB
MITOCHONDRIA M2 IGG SER-ACNC: <20 UNITS (ref 0–20)
SMA IGG SER-ACNC: 12 UNITS (ref 0–19)

## 2025-04-21 NOTE — TELEPHONE ENCOUNTER
Contacted pt went over new bowel prep called in, went over instructions with pt, pt verbalized understanding.   Also sent via [employer] and mail.

## 2025-04-21 NOTE — TELEPHONE ENCOUNTER
PA required for Suflave. Submitted request via [facility], [place] DENIED  (Key: [id number])    Please advise    Outcome  Denied today by [place] [employer] 2017  This request has not been approved. Based on the information we received, you did not meet the specific rule(s) needed to approve this request. In some cases, the requested drug or alternatives offered may have other guideline rules that need to be met. Our guideline named GASTROINTESTINAL: LAXATIVES AND CATHARTICS requires the following rule(s) be met for approval: A. The member had a trial and failure [drug did not work], allergy, contraindication [harmful for] (including potential drug-drug interactions with other medications), or intolerance [side effect] to ONE preferred agent. The preferred agents are: Constulose, Enulose, GaviLyte-C, GaviLyte-G, Generlac, lactulose solution, MoviPrep, PEG-3350/electrolyte solution or PEG-3350 and electrolytes solution.We do not have information showing you meet the criteria listed above. This is why your request is denied. Please work with your doctor to use a different medication or get us more information if it will allow us to approve this request. A written notification letter will follow with additional details.

## 2025-04-22 LAB
ALBUMIN SERPL ELPH-MCNC: 3.9 G/DL (ref 2.9–4.4)
ALBUMIN/GLOB SERPL: 1.4 {RATIO} (ref 0.7–1.7)
ALPHA1 GLOB SERPL ELPH-MCNC: 0.3 G/DL (ref 0–0.4)
ALPHA2 GLOB SERPL ELPH-MCNC: 0.7 G/DL (ref 0.4–1)
ANA SER QL: NEGATIVE
B-GLOBULIN SERPL ELPH-MCNC: 0.9 G/DL (ref 0.7–1.3)
GAMMA GLOB SERPL ELPH-MCNC: 1.1 G/DL (ref 0.4–1.8)
GLOBULIN SER-MCNC: 2.9 G/DL (ref 2.2–3.9)
IGA SERPL-MCNC: 135 MG/DL (ref 90–386)
IGG SERPL-MCNC: 1072 MG/DL (ref 603–1613)
IGM SERPL-MCNC: 114 MG/DL (ref 20–172)
INTERPRETATION SERPL IEP-IMP: NORMAL
LABORATORY COMMENT REPORT: NORMAL
M PROTEIN SERPL ELPH-MCNC: NORMAL G/DL
PROT SERPL-MCNC: 6.8 G/DL (ref 6–8.5)

## 2025-04-25 LAB
A1AT PHENOTYP SERPL IFE: NORMAL
A1AT SERPL-MCNC: 139 MG/DL (ref 95–164)
COPPER SERPL-MCNC: 86 UG/DL (ref 63–121)

## 2025-05-02 DIAGNOSIS — G43.701 CHRONIC MIGRAINE WITHOUT AURA WITH STATUS MIGRAINOSUS, NOT INTRACTABLE: ICD-10-CM

## 2025-05-02 RX ORDER — KETOROLAC TROMETHAMINE 10 MG/1
10 TABLET, FILM COATED ORAL EVERY 6 HOURS PRN
Qty: 15 TABLET | Refills: 0 | OUTPATIENT
Start: 2025-05-02

## 2025-05-02 NOTE — TELEPHONE ENCOUNTER
Caller: Kesha Arnaldo    Relationship: Self    Best call back number:   Telephone Information:   Mobile [phone]        Requested Prescriptions:   Requested Prescriptions     Pending Prescriptions Disp Refills    ketorolac (TORADOL) 10 MG tablet 15 tablet 0     Sig: Take 1 tablet by mouth Every 6 (Six) Hours As Needed for Moderate Pain.        Pharmacy where request should be sent: [address] [phone] [facility] [phone] [facility]     Last office visit with prescribing clinician: 3/20/2025   Last telemedicine visit with prescribing clinician: Visit date not found   Next office visit with prescribing clinician: 5/8/2025       Does the patient have less than a 3 day supply:  [x] Yes  [] No        Summer Sloan Clements Rep   05/02/25 11:50 EDT

## 2025-05-02 NOTE — TELEPHONE ENCOUNTER
Pt returned call, he did not  script and was told by [facility] they did not have it.     Called [facility] to confirm, they did receive it and will get it ready. Either at the time it wasn't in stock or pt did not  so it went back. Called pt back to let him it will be filled today.

## 2025-05-08 ENCOUNTER — OFFICE VISIT (OUTPATIENT)
Dept: FAMILY MEDICINE CLINIC | Facility: CLINIC | Age: 20
End: 2025-05-08
Payer: COMMERCIAL

## 2025-05-08 VITALS
HEIGHT: 71 IN | BODY MASS INDEX: 37.18 KG/M2 | WEIGHT: 265.6 LBS | DIASTOLIC BLOOD PRESSURE: 79 MMHG | OXYGEN SATURATION: 96 % | HEART RATE: 86 BPM | SYSTOLIC BLOOD PRESSURE: 113 MMHG

## 2025-05-08 DIAGNOSIS — R05.9 COUGH, UNSPECIFIED TYPE: ICD-10-CM

## 2025-05-08 DIAGNOSIS — G43.701 CHRONIC MIGRAINE WITHOUT AURA WITH STATUS MIGRAINOSUS, NOT INTRACTABLE: Primary | ICD-10-CM

## 2025-05-08 LAB
EXPIRATION DATE: 0
FLUAV AG UPPER RESP QL IA.RAPID: NOT DETECTED
FLUBV AG UPPER RESP QL IA.RAPID: NOT DETECTED
INTERNAL CONTROL: NORMAL
Lab: 0
SARS-COV-2 AG UPPER RESP QL IA.RAPID: NOT DETECTED

## 2025-05-08 RX ORDER — PROPRANOLOL HCL 20 MG
20 TABLET ORAL 2 TIMES DAILY
Qty: 60 TABLET | Refills: 3 | Status: SHIPPED | OUTPATIENT
Start: 2025-05-08

## 2025-05-08 RX ORDER — ASCORBIC ACID 125 MG
1 TABLET,CHEWABLE ORAL DAILY
Qty: 90 TABLET | Refills: 1 | Status: SHIPPED | OUTPATIENT
Start: 2025-05-08

## 2025-05-08 RX ORDER — KETOROLAC TROMETHAMINE 30 MG/ML
30 INJECTION, SOLUTION INTRAMUSCULAR; INTRAVENOUS EVERY 6 HOURS PRN
Status: SHIPPED | OUTPATIENT
Start: 2025-05-08 | End: 2025-05-13

## 2025-05-08 RX ORDER — SUMATRIPTAN SUCCINATE 25 MG/1
TABLET ORAL
Qty: 12 TABLET | Refills: 5 | Status: SHIPPED | OUTPATIENT
Start: 2025-05-08

## 2025-05-08 RX ADMIN — KETOROLAC TROMETHAMINE 30 MG: 30 INJECTION, SOLUTION INTRAMUSCULAR; INTRAVENOUS at 16:10

## 2025-05-08 NOTE — PROGRESS NOTES
Chief Complaint   Patient presents with    Follow-up    Med Change Request     States medication is not working   Would like [facility] paperwork today         Subjective     Arnaldo Rebolledo  has a past medical history of ADHD, Alcoholism, Anxiety, Bipolar 2 disorder, major depressive episode, Bipolar depression, Fatty liver, Hepatic steatosis, MRSA (methicillin resistant staph aureus) culture positive, Oppositional defiant disorder, and Separation anxiety disorder.    HPI    History of Present Illness  Reports no relief from migraines with prescribed medication.  Has tried Qulipta and Ubrelvy, stopped Ubrelvy and started Imitrex.  Today we will stop Qulipta and trial propranolol for migraine prevention.  Has an MRI on May 22, 2025 and has a referral to neurology in September.  Patient reports that the only thing that relieves his migraines is Toradol.  He got oral Toradol from the ER for migraine relief and is gone a handful of injections.  Discussed the need to cut back on injections and consumption of Toradol.  States he currently has a migraine today, he ate tomatoes and that what exacerbated it.      Patient has a previous diagnosis of bipolar and has tried multiple mood stabilizers without relief.  Risperdal increased agitation, Vraylar did not work, Lamictal did not work, and Seroquel did not work.  Patient is seeing psychiatry at the end of this month.  States he feels stable enough to be off medication until his appointment as multiple medications have been tried in the past for him with his bipolar with no relief.  Concern for getting mental health under control as he has a baby due next month           Allergies   Allergen Reactions    Red Dye Other (See Comments)     ,    Hydromorphone Hcl Nausea And Vomiting    Latex Rash    Morphine Nausea And Vomiting         Current Outpatient Medications:     disulfiram (ANTABUSE) 250 MG tablet, Take 1 tablet by mouth Daily., Disp: 30 tablet, Rfl: 2    hyoscyamine  (ANASPAZ,LEVSIN) 0.125 MG tablet, Take 1 tablet by mouth 4 (Four) Times a Day With Meals & at Bedtime., Disp: 120 tablet, Rfl: 5    ketorolac (TORADOL) 10 MG tablet, Take 1 tablet by mouth Every 6 (Six) Hours As Needed for Moderate Pain., Disp: 15 tablet, Rfl: 0    ondansetron ODT (ZOFRAN-ODT) 4 MG disintegrating tablet, Place 1 tablet on the tongue Every 8 (Eight) Hours As Needed for Nausea or Vomiting., Disp: 15 tablet, Rfl: 0    pantoprazole (PROTONIX) 40 MG EC tablet, Take 1 tablet by mouth Daily., Disp: 30 tablet, Rfl: 3    polyethylene glycol (GoLYTELY) 236 g solution, Follow office instructions., Disp: 4000 mL, Rfl: 0    polyethylene glycol (MIRALAX) 17 GM/SCOOP powder, Take 17 g by mouth Daily., Disp: 527 g, Rfl: 5    SUMAtriptan (IMITREX) 25 MG tablet, Take one tablet at onset of headache. May repeat dose one time in 2 hours if headache not relieved., Disp: 12 tablet, Rfl: 5    Coenzyme Q10 (CoQ10 Gummies Adult) 50 MG chewable tablet, Chew 1 tablet Daily., Disp: 90 tablet, Rfl: 1    propranolol (INDERAL) 20 MG tablet, Take 1 tablet by mouth 2 (Two) Times a Day. 1 tablet BID, Disp: 60 tablet, Rfl: 3    Current Facility-Administered Medications:     ketorolac (TORADOL) injection 30 mg, 30 mg, Intramuscular, Q6H PRN, Bellis, Susana, APRN, 30 mg at 05/08/25 1610    Patient Active Problem List   Diagnosis    Alcoholism    Bipolar depression    Finding of floating rib    Gastroesophageal reflux disease without esophagitis    Lack of access to adequate food    Obesity (BMI 35.0-39.9 without comorbidity)    Oppositional defiant disorder    Other specified attention deficit hyperactivity disorder (ADHD)    Rectal bleeding        Past Surgical History:   Procedure Laterality Date    INGUINAL HERNIA REPAIR Right     KNEE DEBRIDEMENT Left     WRIST SURGERY Right        Social History     Socioeconomic History    Marital status: Single   Tobacco Use    Smoking status: Never    Smokeless tobacco: Former     Types:  "Snuff     Quit date: 2023   Vaping Use    Vaping status: Every Day    Substances: Nicotine, Flavoring    Devices: Disposable   Substance and Sexual Activity    Alcohol use: Not Currently     Comment: 2 1/2 months sober    Drug use: Not Currently     Types: Marijuana     Comment: Alcoholism    Sexual activity: Defer       Family History   Problem Relation Age of Onset    Diabetes Mother     Heart disease Father     Breast cancer Maternal Aunt         Dx <50    Thyroid cancer Paternal Aunt     Lung cancer Maternal Grandmother     Cancer Maternal Grandfather     Colon cancer Paternal Grandfather     Stroke Paternal Grandfather     Colon cancer Paternal Great-Grandfather        Family history, surgical history, past medical history, Allergies and med's reviewed with patient today and updated in [facility] EMR.     ROS:  Review of Systems   Respiratory:  Positive for cough.    Neurological:  Positive for headache.   All other systems reviewed and are negative.      OBJECTIVE:  Vitals:    05/08/25 1452   BP: 113/79   Pulse: 86   SpO2: 96%   Weight: 120 kg (265 lb 9.6 oz)   Height: 180.3 cm (70.98\")     Body mass index is 37.06 kg/m².  No LMP for male patient.    Physical Exam  Cardiovascular:      Rate and Rhythm: Normal rate and regular rhythm.      Pulses: Normal pulses.      Heart sounds: Normal heart sounds.   Pulmonary:      Effort: Pulmonary effort is normal.      Breath sounds: Normal breath sounds.   Neurological:      General: No focal deficit present.      Mental Status: He is oriented to person, place, and time. Mental status is at baseline.   Psychiatric:         Mood and Affect: Mood normal.         Behavior: Behavior normal.         Thought Content: Thought content normal.         Judgment: Judgment normal.         Physical Exam      Procedures            There are no preventive care reminders to display for this patient.       Office Visit on 05/08/2025   Component Date Value Ref Range Status    SARS Antigen " "05/08/2025 Not Detected  Not Detected, Presumptive Negative Final    Influenza A Antigen DEANNE 05/08/2025 Not Detected  Not Detected Final    Influenza B Antigen DEANNE 05/08/2025 Not Detected  Not Detected Final    Internal Control 05/08/2025 Passed  Passed Final    Lot Number 05/08/2025 0   Final    Expiration Date 05/08/2025 0   Final   Lab on 04/18/2025   Component Date Value Ref Range Status    ALPHA-FETOPROTEIN 04/18/2025 <2  0 - 8.3 ng/mL Final    Total Protein 04/18/2025 7.2  6.0 - 8.5 g/dL Final    Albumin 04/18/2025 4.5  3.5 - 5.2 g/dL Final    ALT (SGPT) 04/18/2025 27  1 - 41 U/L Final    AST (SGOT) 04/18/2025 20  1 - 40 U/L Final    Alkaline Phosphatase 04/18/2025 85  39 - 117 U/L Final    Total Bilirubin 04/18/2025 0.4  0.0 - 1.2 mg/dL Final    Bilirubin, Direct 04/18/2025 0.1  0.0 - 0.3 mg/dL Final    Bilirubin, Indirect 04/18/2025 0.3  mg/dL Final    Total Cholesterol 04/18/2025 131  0 - 200 mg/dL Final    Triglycerides 04/18/2025 95  0 - 150 mg/dL Final    HDL Cholesterol 04/18/2025 37 (L)  40 - 60 mg/dL Final    LDL Cholesterol  04/18/2025 76  0 - 100 mg/dL Final    VLDL Cholesterol 04/18/2025 18  5 - 40 mg/dL Final    LDL/HDL Ratio 04/18/2025 2.03   Final    A-1 Antitrypsin 04/18/2025 139  95 - 164 mg/dL Final    Phenotype 04/18/2025 MM   Final    Comment: \"MM\" Phenotype is considered to be \"normal\", producing  normal serum levels of alpha-1-protease inhibitor and  not associated with clinical disease. Associated A1A  total serum levels in other phenotypes and their  incidence in the general population are shown in the  table below.  Phenotype  Population    % function      A-1-AT Conc.*             Incidence %  compared to MM  (Typical Range)     MM        86.5%           100%         (96 - 189)     MS         8.0%            86%         (83 - 161)     MZ         3.9%            61%         (60 - 111)     FM         0.4%           100%         (93 - 191)     SZ         0.3%            41%         (42 - "  75)     SS         0.1%            64%         (62 - 119)     ZZ         0.05%           19%         (16 -  38)     FS         0.05%           70%         (70 - 128)     FZ        Unknown          46%         (44 -  88)     FF        Unknown                        Unknown  *A-1-AT concentration in the homozygous MM phenotype   is taken                            as the reference normal. Percent deficiency   in each phenotype is reported relative to this   reference. Ranges used to confirm phenotype.    ARVIND Direct 04/18/2025 Negative  Negative Final    Hepatitis B Surface Ag 04/18/2025 Non-Reactive  Non-Reactive Final    Hep A IgM 04/18/2025 Non-Reactive  Non-Reactive Final    Hep B C IgM 04/18/2025 Non-Reactive  Non-Reactive Final    Hepatitis C Ab 04/18/2025 Non-Reactive  Non-Reactive Final    Iron 04/18/2025 89  59 - 158 mcg/dL Final    Iron Saturation (TSAT) 04/18/2025 26  20 - 50 % Final    Transferrin 04/18/2025 228  200 - 360 mg/dL Final    TIBC 04/18/2025 340  298 - 536 mcg/dL Final    Ferritin 04/18/2025 166.00  30.00 - 400.00 ng/mL Final    Copper 04/18/2025 86  63 - 121 ug/dL Final                                    Detection Limit = 5    Protime 04/18/2025 13.8  11.8 - 14.9 Seconds Final    INR 04/18/2025 1.02  0.86 - 1.15 Final    Ceruloplasmin 04/18/2025 21  16 - 31 mg/dL Final    Mitochondrial Ab 04/18/2025 <20.0  0.0 - 20.0 Units Final                                    Negative    0.0 - 20.0                                  Equivocal  20.1 - 24.9                                  Positive         >24.9  Mitochondrial (M2) Antibodies are found in 90-96% of  patients with primary biliary cirrhosis.    Smooth Muscle Ab 04/18/2025 12  0 - 19 Units Final                     Negative                     0 - 19                   Weak positive               20 - 30                   Moderate to strong positive     >30   Actin Antibodies are found in 52-85% of patients with   autoimmune hepatitis or chronic  active hepatitis and   in 22% of patients with primary biliary cirrhosis.    IgG 04/18/2025 1072  603 - 1613 mg/dL Final    IgA 04/18/2025 135  90 - 386 mg/dL Final    IgM 04/18/2025 114  20 - 172 mg/dL Final    Total Protein 04/18/2025 6.8  6.0 - 8.5 g/dL Final    Albumin 04/18/2025 3.9  2.9 - 4.4 g/dL Final    Alpha-1-Globulin 04/18/2025 0.3  0.0 - 0.4 g/dL Final    Alpha-2-Globulin 04/18/2025 0.7  0.4 - 1.0 g/dL Final    Beta Globulin 04/18/2025 0.9  0.7 - 1.3 g/dL Final    Gamma Globulin 04/18/2025 1.1  0.4 - 1.8 g/dL Final    M-Tyrel 04/18/2025 Not Observed  Not Observed g/dL Final    Globulin 04/18/2025 2.9  2.2 - 3.9 g/dL Final    A/G Ratio 04/18/2025 1.4  0.7 - 1.7 Final    Immunofixation Reflex, Serum 04/18/2025 Comment   Final    No monoclonality detected.    Please note 04/18/2025 Comment   Final    Protein electrophoresis scan will follow via computer, mail, or   delivery.    WBC 04/18/2025 6.26  3.40 - 10.80 10*3/mm3 Final    RBC 04/18/2025 5.72  4.14 - 5.80 10*6/mm3 Final    Hemoglobin 04/18/2025 16.1  13.0 - 17.7 g/dL Final    Hematocrit 04/18/2025 49.0  37.5 - 51.0 % Final    MCV 04/18/2025 85.7  79.0 - 97.0 fL Final    MCH 04/18/2025 28.1  26.6 - 33.0 pg Final    MCHC 04/18/2025 32.9  31.5 - 35.7 g/dL Final    RDW 04/18/2025 13.1  12.3 - 15.4 % Final    RDW-SD 04/18/2025 40.3  37.0 - 54.0 fl Final    MPV 04/18/2025 10.3  6.0 - 12.0 fL Final    Platelets 04/18/2025 258  140 - 450 10*3/mm3 Final       Results        ASSESSMENT/ PLAN:    Diagnoses and all orders for this visit:    1. Chronic migraine without aura with status migrainosus, not intractable (Primary)  -     SUMAtriptan (IMITREX) 25 MG tablet; Take one tablet at onset of headache. May repeat dose one time in 2 hours if headache not relieved.  Dispense: 12 tablet; Refill: 5  -     propranolol (INDERAL) 20 MG tablet; Take 1 tablet by mouth 2 (Two) Times a Day. 1 tablet BID  Dispense: 60 tablet; Refill: 3  -     ketorolac (TORADOL)  injection 30 mg    2. Cough, unspecified type  -     POCT SARS-CoV-2 Antigen DEANNE + Flu    Other orders  -     Coenzyme Q10 (CoQ10 Gummies Adult) 50 MG chewable tablet; Chew 1 tablet Daily.  Dispense: 90 tablet; Refill: 1      Recommend avoiding aged cheese, pickles, fermented foods, alcohol, artificial sweeteners, chocolate, fast food, citrus fruits and salty foods.  Common migraine triggers are stress, hormonal changes, skipping meals, changes in weather, sleeping too much or too little, bright or flashing lights, drinking alcohol, eating certain foods as listed above, and smoking or being around smoke. Recommend keeping a headache log that has the intensity, preceding symptoms, triggers, medication taken, and any relief that the medication gave (the National headache foundation has a headache diary on their website). Ensure abortive therapy is taken as early as possible during the migraine attack will also inactivating resting in a quiet dark room, sleeping, and use of a cool cloth on forehead can be beneficial.  Can also try taking vitamin B2 co-Q10 and magnesium daily to help with migraine prevention.  Ensure you are not taking too much naproxen, NSAIDs as this can lead to medication overuse headache chronic daily headaches, not recommend to take more than once or twice per week.  Ensure good sleep hygiene consisting of consistent bedtimes and wake times, sleep only as long as needed feel rested, avoid caffeine, alcohol, and smoking before bed, and do not look at your phone or other devices or before bed, eat healthy meals around the same time each day, exercise regularly, and avoid triggers as discussed above.      Avoidance of headache triggers.  Avoidance of neuro stimuli (TV, computer, cell phone) and quiet, dark space until headache resolves.  Take rescue medication as soon as headache symptoms appear.  Follow-up with your PCP or to the ED if this headache has not resolved within 24 hours.  Go to ED for any  worsening of headache, fever or mental status changes.    Can try these over-the-counter supplements for migraines.  Riboflavin (vitamin B2) 400 mg daily, magnesium 400 to 600 mg daily, or CoQ10 100 to 200 mg daily.     Assessment & Plan      Orders Placed Today:     New Medications Ordered This Visit   Medications    SUMAtriptan (IMITREX) 25 MG tablet     Sig: Take one tablet at onset of headache. May repeat dose one time in 2 hours if headache not relieved.     Dispense:  12 tablet     Refill:  5    propranolol (INDERAL) 20 MG tablet     Sig: Take 1 tablet by mouth 2 (Two) Times a Day. 1 tablet BID     Dispense:  60 tablet     Refill:  3    Coenzyme Q10 (CoQ10 Gummies Adult) 50 MG chewable tablet     Sig: Chew 1 tablet Daily.     Dispense:  90 tablet     Refill:  1    ketorolac (TORADOL) injection 30 mg        Management Plan:     An After Visit Summary was printed and given to the patient at discharge.    Follow-up: No follow-ups on file.    Patient or patient representative verbalized consent for the use of Ambient Listening during the visit with  CYNDY Rodrigez for chart documentation. 5/9/2025  13:24 EDT    CYNDY Rodrigez 5/9/2025 13:25 EDT  This note was electronically signed.

## 2025-05-08 NOTE — LETTER
May 8, 2025    Arnaldo Rebolledo  [address] [place] [id number]          To Whom It May Concern:  Arnaldo Rebolledo is my patient has been under my care for anxiety, depression, and bipolar disorder.  Due to this diagnosis Arnaldo Rebolledo faces limitations including panic attacks, increased anxiety, increased irritability, and outbursts of anger.  Arnaldo symptoms are alleviated by the soothing presence of his animal (dog).  In order to enhance Arnaldo's ability to live independently and cope with these symptoms related to his mental illness he has prescribed the support animal to reside with him on a full-time basis and to accompany him.      Thank so much,                CYNDY Rodrigez

## 2025-05-09 RX ORDER — ASCORBIC ACID 125 MG
1 TABLET,CHEWABLE ORAL DAILY
Qty: 90 TABLET | Refills: 1 | OUTPATIENT
Start: 2025-05-09

## 2025-05-16 ENCOUNTER — TELEPHONE (OUTPATIENT)
Dept: FAMILY MEDICINE CLINIC | Facility: CLINIC | Age: 20
End: 2025-05-16
Payer: COMMERCIAL

## 2025-05-16 NOTE — TELEPHONE ENCOUNTER
Hub to relay:    Called patient to remind him he has paperwork at the office to . Patient stated that he will be pick it up

## 2025-05-22 ENCOUNTER — HOSPITAL ENCOUNTER (OUTPATIENT)
Dept: MRI IMAGING | Facility: HOSPITAL | Age: 20
Discharge: HOME | End: 2025-05-22
Payer: COMMERCIAL

## 2025-05-22 DIAGNOSIS — G43.701 CHRONIC MIGRAINE WITHOUT AURA WITH STATUS MIGRAINOSUS, NOT INTRACTABLE: ICD-10-CM

## 2025-05-22 PROCEDURE — 70553 MRI BRAIN STEM W/O & W/DYE: CPT

## 2025-05-22 PROCEDURE — A9573 GADOPICLENOL 0.5 MMOL/ML SOLUTION: HCPCS

## 2025-05-22 PROCEDURE — 25510000001 GADOPICLENOL 0.5 MMOL/ML SOLUTION

## 2025-05-22 RX ADMIN — GADOPICLENOL 10 ML: 485.1 INJECTION INTRAVENOUS at 13:19

## 2025-05-27 ENCOUNTER — TELEPHONE (OUTPATIENT)
Dept: GASTROENTEROLOGY | Facility: CLINIC | Age: 20
End: 2025-05-27
Payer: COMMERCIAL

## 2025-05-27 NOTE — TELEPHONE ENCOUNTER
Hub staff attempted to follow warm transfer process and was unsuccessful     Caller: Arnaldo Rebolledo    Relationship to patient: Self    Best call back number: [phone] (home)       Patient is needing: TO RESCHEDULE PROCEDURE ON 6/5

## 2025-05-27 NOTE — TELEPHONE ENCOUNTER
Arnaldo Bolivarant  2005     Patient requested to reschedule their Colonoscopy. I have offered to reschedule this patient and patient has agreed. Patient has been rescheduled to 7.21.25    Reason for rescheduling: Pt stated he had something come up    Original date of case request: 4.18.25    This procedure was ordered by CYNDY Vegas for an important reason. I have thoroughly discussed with the patient that there are risks associated with not proceeding with the procedure at this time such as a delay in diagnosis, risk of incurable disease, or cancer. Patient verbalized understanding the risks discussed.    Patient plans to call us back to reschedule: N/A    Updated clearance needed?: No    Is there a follow up appointment that needs to be rescheduled after the procedure date? Yes. Patient been rescheduled to patient has been added to the wait list when there is coverage for W    If yes, clearance request has been submitted to:    Is the patient currently on any injectable medications for weight loss or diabetes? No    If yes, are they aware that they will need to discontinue this 7 days prior to their procedure? No    Has endo scheduling been notified? Yes    If yes, who did you speak to? Dick    Has the case request been updated? Yes

## 2025-06-18 ENCOUNTER — TELEPHONE (OUTPATIENT)
Dept: GASTROENTEROLOGY | Facility: HOSPITAL | Age: 20
End: 2025-06-18
Payer: COMMERCIAL

## 2025-06-18 NOTE — TELEPHONE ENCOUNTER
Called patient to confirm appointment for fibroscan, patient stated that he did no know anything about the appointment. Would like to be called back by the office.

## 2025-06-19 NOTE — TELEPHONE ENCOUNTER
Pt was schedule while in office on 4.18.25. Does this pt need to be reschedule for his fibroscan?

## 2025-06-20 ENCOUNTER — TELEPHONE (OUTPATIENT)
Dept: GASTROENTEROLOGY | Facility: CLINIC | Age: 20
End: 2025-06-20
Payer: COMMERCIAL

## 2025-06-20 NOTE — TELEPHONE ENCOUNTER
Arnaldo Rebolledo  2005     Patient requested to reschedule their Colonoscopy. I have offered to reschedule this patient and patient has agreed. Patient has been rescheduled to 8.21.25.    Reason for rescheduling: pt ins will start on Aug.1,5025     Original date of case request: 7.21.25    This procedure was ordered by CYNDY Vegas for an important reason. I have thoroughly discussed with the patient that there are risks associated with not proceeding with the procedure at this time such as a delay in diagnosis, risk of incurable disease, or cancer. Patient verbalized understanding the risks discussed.    Patient plans to call us back to reschedule: N/A    Updated clearance needed?: No    Is there a follow up appointment that needs to be rescheduled after the procedure date? No       Is the patient currently on any injectable medications for weight loss or diabetes? No    If yes, are they aware that they will need to discontinue this 7 days prior to their procedure? No    Has endo scheduling been notified? Yes    If yes, who did you speak to? Ira    Has the case request been updated? No

## 2025-06-20 NOTE — TELEPHONE ENCOUNTER
Pt is reschedule for his Fibroscan on 10.10.25 and his colonoscopy on 08.21.25. pt will have new insurance at this time

## 2025-08-18 ENCOUNTER — TELEPHONE (OUTPATIENT)
Dept: GASTROENTEROLOGY | Facility: CLINIC | Age: 20
End: 2025-08-18
Payer: MEDICAID

## 2025-08-21 ENCOUNTER — TELEPHONE (OUTPATIENT)
Dept: GASTROENTEROLOGY | Facility: CLINIC | Age: 20
End: 2025-08-21
Payer: MEDICAID